# Patient Record
Sex: FEMALE | Race: WHITE | Employment: UNEMPLOYED | URBAN - NONMETROPOLITAN AREA
[De-identification: names, ages, dates, MRNs, and addresses within clinical notes are randomized per-mention and may not be internally consistent; named-entity substitution may affect disease eponyms.]

---

## 2021-06-08 PROBLEM — E66.813 CLASS 3 SEVERE OBESITY DUE TO EXCESS CALORIES WITH SERIOUS COMORBIDITY AND BODY MASS INDEX (BMI) OF 45.0 TO 49.9 IN ADULT: Status: ACTIVE | Noted: 2021-06-08

## 2021-07-27 PROBLEM — E66.813 CLASS 3 SEVERE OBESITY DUE TO EXCESS CALORIES WITH SERIOUS COMORBIDITY AND BODY MASS INDEX (BMI) OF 45.0 TO 49.9 IN ADULT: Status: ACTIVE | Noted: 2021-06-08

## 2024-03-10 SDOH — ECONOMIC STABILITY: FOOD INSECURITY: WITHIN THE PAST 12 MONTHS, YOU WORRIED THAT YOUR FOOD WOULD RUN OUT BEFORE YOU GOT MONEY TO BUY MORE.: PATIENT DECLINED

## 2024-03-10 SDOH — ECONOMIC STABILITY: HOUSING INSECURITY
IN THE LAST 12 MONTHS, WAS THERE A TIME WHEN YOU DID NOT HAVE A STEADY PLACE TO SLEEP OR SLEPT IN A SHELTER (INCLUDING NOW)?: NO

## 2024-03-10 SDOH — ECONOMIC STABILITY: FOOD INSECURITY: WITHIN THE PAST 12 MONTHS, THE FOOD YOU BOUGHT JUST DIDN'T LAST AND YOU DIDN'T HAVE MONEY TO GET MORE.: PATIENT DECLINED

## 2024-03-10 SDOH — ECONOMIC STABILITY: TRANSPORTATION INSECURITY
IN THE PAST 12 MONTHS, HAS LACK OF TRANSPORTATION KEPT YOU FROM MEETINGS, WORK, OR FROM GETTING THINGS NEEDED FOR DAILY LIVING?: NO

## 2024-03-10 SDOH — ECONOMIC STABILITY: INCOME INSECURITY: HOW HARD IS IT FOR YOU TO PAY FOR THE VERY BASICS LIKE FOOD, HOUSING, MEDICAL CARE, AND HEATING?: SOMEWHAT HARD

## 2024-03-13 ENCOUNTER — OFFICE VISIT (OUTPATIENT)
Dept: PRIMARY CARE CLINIC | Age: 54
End: 2024-03-13
Payer: MEDICAID

## 2024-03-13 VITALS
BODY MASS INDEX: 43.99 KG/M2 | DIASTOLIC BLOOD PRESSURE: 80 MMHG | TEMPERATURE: 97.3 F | WEIGHT: 264 LBS | HEART RATE: 77 BPM | SYSTOLIC BLOOD PRESSURE: 130 MMHG | OXYGEN SATURATION: 95 % | HEIGHT: 65 IN

## 2024-03-13 DIAGNOSIS — E03.9 ACQUIRED HYPOTHYROIDISM: ICD-10-CM

## 2024-03-13 DIAGNOSIS — I10 ESSENTIAL HYPERTENSION: Primary | ICD-10-CM

## 2024-03-13 DIAGNOSIS — I10 ESSENTIAL HYPERTENSION: ICD-10-CM

## 2024-03-13 DIAGNOSIS — E78.2 MIXED HYPERLIPIDEMIA: ICD-10-CM

## 2024-03-13 PROCEDURE — 3079F DIAST BP 80-89 MM HG: CPT | Performed by: NURSE PRACTITIONER

## 2024-03-13 PROCEDURE — 3075F SYST BP GE 130 - 139MM HG: CPT | Performed by: NURSE PRACTITIONER

## 2024-03-13 PROCEDURE — 99214 OFFICE O/P EST MOD 30 MIN: CPT | Performed by: NURSE PRACTITIONER

## 2024-03-13 RX ORDER — LISINOPRIL 10 MG/1
10 TABLET ORAL DAILY
Qty: 90 TABLET | Refills: 0 | Status: SHIPPED | OUTPATIENT
Start: 2024-03-13 | End: 2024-03-15 | Stop reason: ALTCHOICE

## 2024-03-13 RX ORDER — LEVOTHYROXINE SODIUM 88 UG/1
88 TABLET ORAL DAILY
Qty: 90 TABLET | Refills: 0 | Status: SHIPPED | OUTPATIENT
Start: 2024-03-13 | End: 2025-03-08

## 2024-03-13 RX ORDER — LISINOPRIL 10 MG/1
10 TABLET ORAL DAILY
Qty: 30 TABLET | Refills: 0 | Status: SHIPPED | OUTPATIENT
Start: 2024-03-13 | End: 2024-03-13

## 2024-03-13 RX ORDER — LEVOTHYROXINE SODIUM 88 UG/1
88 TABLET ORAL DAILY
Qty: 60 TABLET | Refills: 0 | Status: SHIPPED | OUTPATIENT
Start: 2024-03-13 | End: 2024-03-13

## 2024-03-13 ASSESSMENT — PATIENT HEALTH QUESTIONNAIRE - PHQ9
SUM OF ALL RESPONSES TO PHQ QUESTIONS 1-9: 8
10. IF YOU CHECKED OFF ANY PROBLEMS, HOW DIFFICULT HAVE THESE PROBLEMS MADE IT FOR YOU TO DO YOUR WORK, TAKE CARE OF THINGS AT HOME, OR GET ALONG WITH OTHER PEOPLE: SOMEWHAT DIFFICULT
6. FEELING BAD ABOUT YOURSELF - OR THAT YOU ARE A FAILURE OR HAVE LET YOURSELF OR YOUR FAMILY DOWN: NOT AT ALL
SUM OF ALL RESPONSES TO PHQ9 QUESTIONS 1 & 2: 2
1. LITTLE INTEREST OR PLEASURE IN DOING THINGS: 2
1. LITTLE INTEREST OR PLEASURE IN DOING THINGS: MORE THAN HALF THE DAYS
2. FEELING DOWN, DEPRESSED OR HOPELESS: NOT AT ALL
9. THOUGHTS THAT YOU WOULD BE BETTER OFF DEAD, OR OF HURTING YOURSELF: 0
2. FEELING DOWN, DEPRESSED OR HOPELESS: 0
SUM OF ALL RESPONSES TO PHQ QUESTIONS 1-9: 8
7. TROUBLE CONCENTRATING ON THINGS, SUCH AS READING THE NEWSPAPER OR WATCHING TELEVISION: SEVERAL DAYS
8. MOVING OR SPEAKING SO SLOWLY THAT OTHER PEOPLE COULD HAVE NOTICED. OR THE OPPOSITE, BEING SO FIGETY OR RESTLESS THAT YOU HAVE BEEN MOVING AROUND A LOT MORE THAN USUAL: 0
3. TROUBLE FALLING OR STAYING ASLEEP: NOT AT ALL
8. MOVING OR SPEAKING SO SLOWLY THAT OTHER PEOPLE COULD HAVE NOTICED. OR THE OPPOSITE - BEING SO FIDGETY OR RESTLESS THAT YOU HAVE BEEN MOVING AROUND A LOT MORE THAN USUAL: NOT AT ALL
10. IF YOU CHECKED OFF ANY PROBLEMS, HOW DIFFICULT HAVE THESE PROBLEMS MADE IT FOR YOU TO DO YOUR WORK, TAKE CARE OF THINGS AT HOME, OR GET ALONG WITH OTHER PEOPLE: 1
4. FEELING TIRED OR HAVING LITTLE ENERGY: MORE THAN HALF THE DAYS
5. POOR APPETITE OR OVEREATING: NEARLY EVERY DAY
6. FEELING BAD ABOUT YOURSELF - OR THAT YOU ARE A FAILURE OR HAVE LET YOURSELF OR YOUR FAMILY DOWN: 0
SUM OF ALL RESPONSES TO PHQ QUESTIONS 1-9: 8
7. TROUBLE CONCENTRATING ON THINGS, SUCH AS READING THE NEWSPAPER OR WATCHING TELEVISION: 1
4. FEELING TIRED OR HAVING LITTLE ENERGY: 2
3. TROUBLE FALLING OR STAYING ASLEEP: 0
SUM OF ALL RESPONSES TO PHQ QUESTIONS 1-9: 8
SUM OF ALL RESPONSES TO PHQ QUESTIONS 1-9: 8
9. THOUGHTS THAT YOU WOULD BE BETTER OFF DEAD, OR OF HURTING YOURSELF: NOT AT ALL
5. POOR APPETITE OR OVEREATING: 3

## 2024-03-13 ASSESSMENT — ENCOUNTER SYMPTOMS
DIARRHEA: 0
VOMITING: 0
COLOR CHANGE: 0
ABDOMINAL PAIN: 0
SORE THROAT: 0
SHORTNESS OF BREATH: 0
CHEST TIGHTNESS: 0
NAUSEA: 0
COUGH: 0

## 2024-03-13 NOTE — PROGRESS NOTES
[address] [place] [address] Drive   [address] [facility], [address]     Phone:  [phone]  Fax:  [phone]      Isabella Keys is a 53 y.o. female who presents today for her medical conditions/complaints as noted below.  Isabella Keys is c/o of Hypertension      Chief Complaint   Patient presents with    Hypertension       HPI:     HPI     Patient presents for elevated blood pressure. Blood pressure has been as high as 150/100 on Friday and has not been taking any of her medication related to not having insurance. Denies any shortness of breath or chest pain.    No past medical history on file.     Past Surgical History:   Procedure Laterality Date    CHOLECYSTECTOMY      DILATION AND CURETTAGE OF UTERUS      HYSTERECTOMY (CERVIX STATUS UNKNOWN)      PARTIAL HYSTERECTOMY (CERVIX NOT REMOVED)      TONSILLECTOMY AND ADENOIDECTOMY      TUBAL LIGATION         Social History     Tobacco Use    Smoking status: Former     Current packs/day: 0.00     Average packs/day: 1 pack/day for 18.0 years (18.0 ttl pk-yrs)     Types: Cigarettes     Start date:      Quit date:      Years since quittin.2    Smokeless tobacco: Never   Substance Use Topics    Alcohol use: No        Current Outpatient Medications   Medication Sig Dispense Refill    lisinopril (PRINIVIL;ZESTRIL) 10 MG tablet Take 1 tablet by mouth daily 30 tablet 0    levothyroxine (SYNTHROID) 88 MCG tablet Take 1 tablet by mouth daily 60 tablet 0     No current facility-administered medications for this visit.       Allergies   Allergen Reactions    Penicillins     Sulfa Antibiotics        Family History   Problem Relation Age of Onset    Diabetes Paternal Uncle     High Blood Pressure Paternal Uncle     Diabetes Paternal Grandmother     High Blood Pressure Paternal Grandmother     Heart Attack Paternal Grandmother     Heart Disease Paternal Grandmother     Colon Cancer Neg Hx     Colon Polyps Neg Hx                Subjective:      Review of Systems

## 2024-03-15 ENCOUNTER — OFFICE VISIT (OUTPATIENT)
Dept: PRIMARY CARE CLINIC | Age: 54
End: 2024-03-15
Payer: MEDICAID

## 2024-03-15 VITALS
HEART RATE: 81 BPM | BODY MASS INDEX: 43.89 KG/M2 | TEMPERATURE: 97.5 F | OXYGEN SATURATION: 98 % | HEIGHT: 65 IN | SYSTOLIC BLOOD PRESSURE: 122 MMHG | WEIGHT: 263.4 LBS | DIASTOLIC BLOOD PRESSURE: 88 MMHG

## 2024-03-15 DIAGNOSIS — I10 ESSENTIAL HYPERTENSION: Primary | ICD-10-CM

## 2024-03-15 DIAGNOSIS — E03.9 ACQUIRED HYPOTHYROIDISM: ICD-10-CM

## 2024-03-15 PROCEDURE — 3079F DIAST BP 80-89 MM HG: CPT | Performed by: NURSE PRACTITIONER

## 2024-03-15 PROCEDURE — 99214 OFFICE O/P EST MOD 30 MIN: CPT | Performed by: NURSE PRACTITIONER

## 2024-03-15 PROCEDURE — 3074F SYST BP LT 130 MM HG: CPT | Performed by: NURSE PRACTITIONER

## 2024-03-15 NOTE — PROGRESS NOTES
[address] [place] [address] Drive   [address] [facility], [address]     Phone:  [phone]  Fax:  [phone]      Isabella Keys is a 53 y.o. female who presents today for her medical conditions/complaints as noted below.  Isabella Keys is c/o of Medication Problem, Bloated, and Dizziness (Off balance)      Chief Complaint   Patient presents with    Medication Problem    Bloated    Dizziness     Off balance       HPI:     HPI    Patient presents with concerns related to her lisinopril. This was restarted 2 days ago and \"ever since taking it I have felt dizzy, bloated, light headed, and\"just not right.\" Blood pressure at home this morning after taking her medicine was 68/42. She took it a second time and and it was 88/62. 122/88 in office today. Denies any shortness of breath or chest pain. Denies any concerns with her thyroid.     History reviewed. No pertinent past medical history.     Past Surgical History:   Procedure Laterality Date    CHOLECYSTECTOMY      DILATION AND CURETTAGE OF UTERUS      HYSTERECTOMY (CERVIX STATUS UNKNOWN)      PARTIAL HYSTERECTOMY (CERVIX NOT REMOVED)      TONSILLECTOMY AND ADENOIDECTOMY  1986    TUBAL LIGATION         Social History     Tobacco Use    Smoking status: Former     Current packs/day: 0.00     Average packs/day: 1 pack/day for 18.0 years (18.0 ttl pk-yrs)     Types: Cigarettes     Start date:      Quit date:      Years since quittin.2    Smokeless tobacco: Never   Substance Use Topics    Alcohol use: No        Current Outpatient Medications   Medication Sig Dispense Refill    levothyroxine (SYNTHROID) 88 MCG tablet TAKE 1 TABLET BY MOUTH DAILY 90 tablet 0     No current facility-administered medications for this visit.       Allergies   Allergen Reactions    Penicillins     Sulfa Antibiotics        Family History   Problem Relation Age of Onset    Diabetes Paternal Uncle     High Blood Pressure Paternal Uncle     Diabetes Paternal Grandmother     High Blood

## 2024-03-25 ENCOUNTER — OFFICE VISIT (OUTPATIENT)
Dept: PRIMARY CARE CLINIC | Age: 54
End: 2024-03-25
Payer: MEDICAID

## 2024-03-25 VITALS
TEMPERATURE: 97.7 F | BODY MASS INDEX: 43.28 KG/M2 | WEIGHT: 259.8 LBS | SYSTOLIC BLOOD PRESSURE: 110 MMHG | HEART RATE: 64 BPM | DIASTOLIC BLOOD PRESSURE: 78 MMHG | OXYGEN SATURATION: 100 % | HEIGHT: 65 IN

## 2024-03-25 DIAGNOSIS — E03.9 ACQUIRED HYPOTHYROIDISM: ICD-10-CM

## 2024-03-25 DIAGNOSIS — I10 ESSENTIAL HYPERTENSION: ICD-10-CM

## 2024-03-25 DIAGNOSIS — E78.2 MIXED HYPERLIPIDEMIA: ICD-10-CM

## 2024-03-25 DIAGNOSIS — I10 ESSENTIAL HYPERTENSION: Primary | ICD-10-CM

## 2024-03-25 LAB
ALBUMIN SERPL-MCNC: 4.1 G/DL (ref 3.5–5.2)
ALP SERPL-CCNC: 142 U/L (ref 35–104)
ALT SERPL-CCNC: 21 U/L (ref 5–33)
ANION GAP SERPL CALCULATED.3IONS-SCNC: 7 MMOL/L (ref 7–19)
AST SERPL-CCNC: 16 U/L (ref 5–32)
BASOPHILS # BLD: 0 K/UL (ref 0–0.2)
BASOPHILS NFR BLD: 0.4 % (ref 0–1)
BILIRUB SERPL-MCNC: 0.3 MG/DL (ref 0.2–1.2)
BUN SERPL-MCNC: 19 MG/DL (ref 6–20)
CALCIUM SERPL-MCNC: 9.2 MG/DL (ref 8.6–10)
CHLORIDE SERPL-SCNC: 105 MMOL/L (ref 98–111)
CHOLEST SERPL-MCNC: 195 MG/DL (ref 160–199)
CO2 SERPL-SCNC: 29 MMOL/L (ref 22–29)
CREAT SERPL-MCNC: 0.9 MG/DL (ref 0.5–0.9)
CREAT UR-MCNC: 216.3 MG/DL (ref 28–217)
EOSINOPHIL # BLD: 0.1 K/UL (ref 0–0.6)
EOSINOPHIL NFR BLD: 0.8 % (ref 0–5)
ERYTHROCYTE [DISTWIDTH] IN BLOOD BY AUTOMATED COUNT: 13.2 % (ref 11.5–14.5)
GLUCOSE SERPL-MCNC: 111 MG/DL (ref 74–109)
HCT VFR BLD AUTO: 45.1 % (ref 37–47)
HDLC SERPL-MCNC: 41 MG/DL (ref 65–121)
HGB BLD-MCNC: 14.2 G/DL (ref 12–16)
IMM GRANULOCYTES # BLD: 0 K/UL
LDLC SERPL CALC-MCNC: 127 MG/DL
LYMPHOCYTES # BLD: 2.8 K/UL (ref 1.1–4.5)
LYMPHOCYTES NFR BLD: 30.4 % (ref 20–40)
MCH RBC QN AUTO: 29 PG (ref 27–31)
MCHC RBC AUTO-ENTMCNC: 31.5 G/DL (ref 33–37)
MCV RBC AUTO: 92 FL (ref 81–99)
MICROALBUMIN UR-MCNC: <1.2 MG/DL (ref 0–19)
MICROALBUMIN/CREAT UR-RTO: NORMAL MG/G
MONOCYTES # BLD: 0.5 K/UL (ref 0–0.9)
MONOCYTES NFR BLD: 5.7 % (ref 0–10)
NEUTROPHILS # BLD: 5.7 K/UL (ref 1.5–7.5)
NEUTS SEG NFR BLD: 62.5 % (ref 50–65)
PLATELET # BLD AUTO: 251 K/UL (ref 130–400)
PMV BLD AUTO: 11.4 FL (ref 9.4–12.3)
POTASSIUM SERPL-SCNC: 4.2 MMOL/L (ref 3.5–5)
PROT SERPL-MCNC: 7.8 G/DL (ref 6.6–8.7)
RBC # BLD AUTO: 4.9 M/UL (ref 4.2–5.4)
SODIUM SERPL-SCNC: 141 MMOL/L (ref 136–145)
T4 FREE SERPL-MCNC: 1.12 NG/DL (ref 0.93–1.7)
TRIGL SERPL-MCNC: 135 MG/DL (ref 0–149)
TSH SERPL DL<=0.005 MIU/L-ACNC: 1.39 UIU/ML (ref 0.27–4.2)
WBC # BLD AUTO: 9.2 K/UL (ref 4.8–10.8)

## 2024-03-25 PROCEDURE — 3078F DIAST BP <80 MM HG: CPT | Performed by: NURSE PRACTITIONER

## 2024-03-25 PROCEDURE — 99214 OFFICE O/P EST MOD 30 MIN: CPT | Performed by: NURSE PRACTITIONER

## 2024-03-25 PROCEDURE — 3074F SYST BP LT 130 MM HG: CPT | Performed by: NURSE PRACTITIONER

## 2024-03-25 RX ORDER — LEVOTHYROXINE SODIUM 88 UG/1
88 TABLET ORAL DAILY
Qty: 90 TABLET | Refills: 3 | Status: SHIPPED | OUTPATIENT
Start: 2024-03-25 | End: 2025-03-20

## 2024-03-25 ASSESSMENT — ENCOUNTER SYMPTOMS
COUGH: 0
NAUSEA: 0
SORE THROAT: 0
VOMITING: 0
CHEST TIGHTNESS: 0
ABDOMINAL PAIN: 0
COLOR CHANGE: 0
SHORTNESS OF BREATH: 0
DIARRHEA: 0

## 2024-03-25 NOTE — PROGRESS NOTES
[address] [place] [address] Drive   [address] [facility], [address]     Phone:  [phone]  Fax:  [phone]      Isabella Keys is a 53 y.o. female who presents today for her medical conditions/complaints as noted below.  Isabella Keys is c/o of 2 Week Follow-Up      Chief Complaint   Patient presents with    2 Week Follow-Up       HPI:     HPI    Patient presents for a 2 week follow up for hypertension. Denies any dizziness, headaches, shortness of breath, or chest pain. Feels fatigued. Has not been taking lisinopril. Blood pressure has been as high as 150/90 at home, and has also been lower.  Blood pressure is 110/78 in office today    History reviewed. No pertinent past medical history.     Past Surgical History:   Procedure Laterality Date    CHOLECYSTECTOMY      DILATION AND CURETTAGE OF UTERUS      HYSTERECTOMY (CERVIX STATUS UNKNOWN)      PARTIAL HYSTERECTOMY (CERVIX NOT REMOVED)      TONSILLECTOMY AND ADENOIDECTOMY      TUBAL LIGATION         Social History     Tobacco Use    Smoking status: Former     Current packs/day: 0.00     Average packs/day: 1 pack/day for 18.0 years (18.0 ttl pk-yrs)     Types: Cigarettes     Start date:      Quit date:      Years since quittin.2    Smokeless tobacco: Never   Substance Use Topics    Alcohol use: No        Current Outpatient Medications   Medication Sig Dispense Refill    levothyroxine (SYNTHROID) 88 MCG tablet Take 1 tablet by mouth daily 90 tablet 3     No current facility-administered medications for this visit.       Allergies   Allergen Reactions    Penicillins     Sulfa Antibiotics        Family History   Problem Relation Age of Onset    Diabetes Paternal Uncle     High Blood Pressure Paternal Uncle     Diabetes Paternal Grandmother     High Blood Pressure Paternal Grandmother     Heart Attack Paternal Grandmother     Heart Disease Paternal Grandmother     Colon Cancer Neg Hx     Colon Polyps Neg Hx                Subjective:      Review of

## 2024-04-10 ENCOUNTER — OFFICE VISIT (OUTPATIENT)
Dept: PRIMARY CARE CLINIC | Age: 54
End: 2024-04-10
Payer: MEDICAID

## 2024-04-10 VITALS
DIASTOLIC BLOOD PRESSURE: 86 MMHG | TEMPERATURE: 97.1 F | BODY MASS INDEX: 42.99 KG/M2 | HEIGHT: 65 IN | HEART RATE: 67 BPM | OXYGEN SATURATION: 99 % | RESPIRATION RATE: 18 BRPM | WEIGHT: 258 LBS | SYSTOLIC BLOOD PRESSURE: 118 MMHG

## 2024-04-10 DIAGNOSIS — L02.422 FURUNCLE OF LEFT AXILLA: Primary | ICD-10-CM

## 2024-04-10 PROCEDURE — 3074F SYST BP LT 130 MM HG: CPT | Performed by: NURSE PRACTITIONER

## 2024-04-10 PROCEDURE — 99214 OFFICE O/P EST MOD 30 MIN: CPT | Performed by: NURSE PRACTITIONER

## 2024-04-10 PROCEDURE — 3079F DIAST BP 80-89 MM HG: CPT | Performed by: NURSE PRACTITIONER

## 2024-04-10 RX ORDER — CLINDAMYCIN HYDROCHLORIDE 300 MG/1
300 CAPSULE ORAL 3 TIMES DAILY
Qty: 30 CAPSULE | Refills: 0 | Status: SHIPPED | OUTPATIENT
Start: 2024-04-10 | End: 2024-04-20

## 2024-04-10 NOTE — PROGRESS NOTES
[address] [place] [address] Drive   [address] [facility], [address]     Phone:  [phone]  Fax:  [phone]      Isabella Keys is a 53 y.o. female who presents today for her medical conditions/complaints as noted below.  Isabella Keys is c/o of Cyst (Left axilla; painful, red, swollen)      Chief Complaint   Patient presents with    Cyst     Left axilla; painful, red, swollen       HPI:     HPI    Patient presents for left axilla swelling that has been painful and red for the last 3 days. Denies any drainage, fever or chills.     No past medical history on file.     Past Surgical History:   Procedure Laterality Date    CHOLECYSTECTOMY      DILATION AND CURETTAGE OF UTERUS      HYSTERECTOMY (CERVIX STATUS UNKNOWN)      PARTIAL HYSTERECTOMY (CERVIX NOT REMOVED)      TONSILLECTOMY AND ADENOIDECTOMY      TUBAL LIGATION         Social History     Tobacco Use    Smoking status: Former     Current packs/day: 0.00     Average packs/day: 1 pack/day for 18.0 years (18.0 ttl pk-yrs)     Types: Cigarettes     Start date:      Quit date:      Years since quittin.3    Smokeless tobacco: Never   Substance Use Topics    Alcohol use: No        Current Outpatient Medications   Medication Sig Dispense Refill    clindamycin (CLEOCIN) 300 MG capsule Take 1 capsule by mouth 3 times daily for 10 days 30 capsule 0    levothyroxine (SYNTHROID) 88 MCG tablet Take 1 tablet by mouth daily 90 tablet 3     No current facility-administered medications for this visit.       Allergies   Allergen Reactions    Penicillins     Sulfa Antibiotics        Family History   Problem Relation Age of Onset    Diabetes Paternal Uncle     High Blood Pressure Paternal Uncle     Diabetes Paternal Grandmother     High Blood Pressure Paternal Grandmother     Heart Attack Paternal Grandmother     Heart Disease Paternal Grandmother     Colon Cancer Neg Hx     Colon Polyps Neg Hx                Subjective:      Review of Systems   Constitutional:

## 2024-04-16 ASSESSMENT — ENCOUNTER SYMPTOMS
CHEST TIGHTNESS: 0
VOMITING: 0
COUGH: 0
NAUSEA: 0
ABDOMINAL PAIN: 0
SORE THROAT: 0
SHORTNESS OF BREATH: 0
COLOR CHANGE: 0
DIARRHEA: 0

## 2024-04-29 ENCOUNTER — OFFICE VISIT (OUTPATIENT)
Dept: PRIMARY CARE CLINIC | Age: 54
End: 2024-04-29
Payer: MEDICAID

## 2024-04-29 VITALS
TEMPERATURE: 97 F | SYSTOLIC BLOOD PRESSURE: 110 MMHG | HEIGHT: 65 IN | DIASTOLIC BLOOD PRESSURE: 74 MMHG | BODY MASS INDEX: 44.68 KG/M2 | HEART RATE: 64 BPM | OXYGEN SATURATION: 97 % | WEIGHT: 268.2 LBS

## 2024-04-29 DIAGNOSIS — E66.01 OBESITY, CLASS III, BMI 40-49.9 (MORBID OBESITY) (HCC): ICD-10-CM

## 2024-04-29 DIAGNOSIS — Z12.12 SCREENING FOR COLORECTAL CANCER: ICD-10-CM

## 2024-04-29 DIAGNOSIS — E66.01 OBESITY, CLASS III, BMI 40-49.9 (MORBID OBESITY) (HCC): Primary | ICD-10-CM

## 2024-04-29 DIAGNOSIS — Z12.11 SCREENING FOR COLORECTAL CANCER: ICD-10-CM

## 2024-04-29 PROCEDURE — 99214 OFFICE O/P EST MOD 30 MIN: CPT | Performed by: NURSE PRACTITIONER

## 2024-04-29 PROCEDURE — 3078F DIAST BP <80 MM HG: CPT | Performed by: NURSE PRACTITIONER

## 2024-04-29 PROCEDURE — 3074F SYST BP LT 130 MM HG: CPT | Performed by: NURSE PRACTITIONER

## 2024-04-29 ASSESSMENT — ENCOUNTER SYMPTOMS
ABDOMINAL PAIN: 0
CHEST TIGHTNESS: 0
SHORTNESS OF BREATH: 0
SORE THROAT: 0
NAUSEA: 0
VOMITING: 0
COLOR CHANGE: 0
COUGH: 0
DIARRHEA: 0

## 2024-04-29 NOTE — PROGRESS NOTES
[address] [place] [address] Drive   [address] [facility], [address]     Phone:  [phone]  Fax:  [phone]      Isabella Keys is a 53 y.o. female who presents today for her medical conditions/complaints as noted below.  Isabella Keys is c/o of 1 Month Follow-Up, Hypertension, and Cyst (Furnucle)      Chief Complaint   Patient presents with    1 Month Follow-Up    Hypertension    Cyst     Furnucle       HPI:     HPI     Patient presents for 2 week follow up for furnucle left axilla. Patient reports area is much better. No redness, or drainage present. Does feel some tenderness at times. Would like to lose weight and has tried metformin the past with success.  Is agreeable to do a cologuard for colorectal cancer screening. Denies any family history of colorectal cancer.     No past medical history on file.     Past Surgical History:   Procedure Laterality Date    CHOLECYSTECTOMY      DILATION AND CURETTAGE OF UTERUS      HYSTERECTOMY (CERVIX STATUS UNKNOWN)      PARTIAL HYSTERECTOMY (CERVIX NOT REMOVED)      TONSILLECTOMY AND ADENOIDECTOMY      TUBAL LIGATION         Social History     Tobacco Use    Smoking status: Former     Current packs/day: 0.00     Average packs/day: 1 pack/day for 18.0 years (18.0 ttl pk-yrs)     Types: Cigarettes     Start date:      Quit date:      Years since quittin.3    Smokeless tobacco: Never   Substance Use Topics    Alcohol use: No        Current Outpatient Medications   Medication Sig Dispense Refill    metFORMIN (GLUCOPHAGE) 500 MG tablet Take 1 tablet by mouth 2 times daily (with meals) 60 tablet 0    levothyroxine (SYNTHROID) 88 MCG tablet Take 1 tablet by mouth daily 90 tablet 3     No current facility-administered medications for this visit.       Allergies   Allergen Reactions    Penicillins     Sulfa Antibiotics        Family History   Problem Relation Age of Onset    Diabetes Paternal Uncle     High Blood Pressure Paternal Uncle     Diabetes Paternal

## 2024-05-14 LAB — NONINV COLON CA DNA+OCC BLD SCRN STL QL: NEGATIVE

## 2024-05-30 ENCOUNTER — OFFICE VISIT (OUTPATIENT)
Dept: FAMILY MEDICINE CLINIC | Facility: CLINIC | Age: 54
End: 2024-05-30
Payer: MEDICAID

## 2024-05-30 VITALS
OXYGEN SATURATION: 98 % | HEIGHT: 65 IN | WEIGHT: 266 LBS | HEART RATE: 97 BPM | SYSTOLIC BLOOD PRESSURE: 112 MMHG | BODY MASS INDEX: 44.32 KG/M2 | TEMPERATURE: 98.1 F | DIASTOLIC BLOOD PRESSURE: 70 MMHG | RESPIRATION RATE: 18 BRPM

## 2024-05-30 DIAGNOSIS — I10 PRIMARY HYPERTENSION: ICD-10-CM

## 2024-05-30 DIAGNOSIS — E03.9 HYPOTHYROIDISM, UNSPECIFIED TYPE: ICD-10-CM

## 2024-05-30 DIAGNOSIS — L98.9 SKIN LESION: ICD-10-CM

## 2024-05-30 DIAGNOSIS — Z13.9 SCREENING DUE: ICD-10-CM

## 2024-05-30 DIAGNOSIS — E66.01 CLASS 3 SEVERE OBESITY DUE TO EXCESS CALORIES WITH SERIOUS COMORBIDITY AND BODY MASS INDEX (BMI) OF 40.0 TO 44.9 IN ADULT: ICD-10-CM

## 2024-05-30 DIAGNOSIS — Z76.89 ENCOUNTER TO ESTABLISH CARE: Primary | ICD-10-CM

## 2024-05-30 LAB
EXPIRATION DATE: ABNORMAL
HBA1C MFR BLD: 5.8 % (ref 4.5–5.7)
Lab: ABNORMAL

## 2024-05-30 PROCEDURE — 3078F DIAST BP <80 MM HG: CPT | Performed by: NURSE PRACTITIONER

## 2024-05-30 PROCEDURE — 99204 OFFICE O/P NEW MOD 45 MIN: CPT | Performed by: NURSE PRACTITIONER

## 2024-05-30 PROCEDURE — 83036 HEMOGLOBIN GLYCOSYLATED A1C: CPT | Performed by: NURSE PRACTITIONER

## 2024-05-30 PROCEDURE — 3074F SYST BP LT 130 MM HG: CPT | Performed by: NURSE PRACTITIONER

## 2024-05-30 PROCEDURE — 3044F HG A1C LEVEL LT 7.0%: CPT | Performed by: NURSE PRACTITIONER

## 2024-05-30 RX ORDER — HYDROCHLOROTHIAZIDE 12.5 MG/1
12.5 TABLET ORAL DAILY
Qty: 30 TABLET | Refills: 1 | Status: SHIPPED | OUTPATIENT
Start: 2024-05-30

## 2024-05-30 RX ORDER — LEVOTHYROXINE SODIUM 88 UG/1
88 TABLET ORAL DAILY
Qty: 90 TABLET | Refills: 1 | Status: SHIPPED | OUTPATIENT
Start: 2024-05-30

## 2024-05-30 NOTE — PROGRESS NOTES
"DANA Castle  [facility]   Family Medicine  [address]  [place] [address]  Phone: [phone]  Fax: [phone]         Chief Complaint:  Chief Complaint   Patient presents with    Establish Care    Hypertension        History:  Libia Trevino is a 53 y.o. female that is an established patient. She  is here for evaluation of the above complaint and management of chronic conditions.    HPI     She is here to establish care. She has known PMH of hypertension, hyperlipidemia, hypothyroidism, anxiety, arthritis. Labs 3/2024 show lipid panel wnl, thyroid functions wnl, A1c is 5.8% today. Mammogram due 9/2024. She is s/p benign hysterectomy 2008. Cologuard negative 5/2024.    She is taking Metformin for weight loss, but A1c shows prediabetes today. She has  askin tag on the upper right extremity that has gotten larger over the last 10 years and is irritating when rubbing on clothes and in hot weather.     Mood is stable, not taking Abilify, Bupropion now. She is a former smoker, quit in 2012.         ROS:  Review of Systems   Constitutional: Negative.    HENT: Negative.     Respiratory: Negative.     Cardiovascular: Negative.    Gastrointestinal: Negative.    Skin:         Skin lesion   Psychiatric/Behavioral: Negative.           reports that she quit smoking about 12 years ago. Her smoking use included cigarettes. She has never used smokeless tobacco. She reports that she does not currently use alcohol. She reports that she does not use drugs.    Current Outpatient Medications   Medication Instructions    hydroCHLOROthiazide 12.5 mg, Oral, Daily    levothyroxine (SYNTHROID, LEVOTHROID) 88 mcg, Oral, Daily    metFORMIN (GLUCOPHAGE) 1,000 mg, Oral, 2 Times Daily With Meals, Weight loss    Vitamin B 12 500 mcg, Oral, Daily       OBJECTIVE:  /70   Pulse 97   Temp 98.1 °F (36.7 °C) (Temporal)   Resp 18   Ht 165.1 cm (65\")   Wt 121 kg (266 lb)   SpO2 98%   BMI 44.26 kg/m²  "   Physical Exam  Vitals and nursing note reviewed.   Constitutional:       Appearance: Normal appearance.   HENT:      Head: Normocephalic and atraumatic.      Right Ear: Tympanic membrane, ear canal and external ear normal.      Left Ear: Hearing, ear canal and external ear normal. There is impacted cerumen.      Nose: Nose normal.      Mouth/Throat:      Pharynx: Oropharynx is clear. No oropharyngeal exudate or posterior oropharyngeal erythema.   Eyes:      Conjunctiva/sclera: Conjunctivae normal.   Cardiovascular:      Rate and Rhythm: Normal rate and regular rhythm.   Pulmonary:      Effort: Pulmonary effort is normal. No respiratory distress.      Breath sounds: Normal breath sounds. No wheezing.   Skin:            Comments: 2 cm round skin tag   Neurological:      General: No focal deficit present.      Mental Status: She is alert and oriented to person, place, and time.   Psychiatric:         Mood and Affect: Mood normal.         Behavior: Behavior normal.         Procedures    Assessment/Plan:     Diagnoses and all orders for this visit:    1. Encounter to establish care (Primary)    2. Screening due  -     POC Glycosylated Hemoglobin (Hb A1C)    3. Skin lesion  -     Ambulatory Referral to Dermatology    4. Primary hypertension  -     hydroCHLOROthiazide 12.5 MG tablet; Take 1 tablet by mouth Daily.  Dispense: 30 tablet; Refill: 1    5. Hypothyroidism, unspecified type  -     levothyroxine (SYNTHROID, LEVOTHROID) 88 MCG tablet; Take 1 tablet by mouth Daily.  Dispense: 90 tablet; Refill: 1    6. Class 3 severe obesity due to excess calories with serious comorbidity and body mass index (BMI) of 40.0 to 44.9 in adult  -     metFORMIN (GLUCOPHAGE) 500 MG tablet; Take 2 tablets by mouth 2 (Two) Times a Day With Meals. Weight loss  Dispense: 180 tablet; Refill: 1      Class 3 Severe Obesity (BMI >=40). Obesity-related health conditions include the following: hypertension. Obesity is newly identified. BMI is is  above average; BMI management plan is completed. We discussed portion control and increasing exercise.       An After Visit Summary was printed and given to the patient at discharge.  Return in about 3 months (around 8/30/2024).       Patient Instructions   HCTZ 12.5 mg, 1 by mouth daily  Continue Levothyroxine as you are  Metformin 1000 mg twice a day      Discussion:     Return 3 months for f/u hypertension, annual exam    I spent 32 minutes caring for Libia on this date of service. This time includes time spent by me in the following activities: preparing for the visit, reviewing tests, obtaining and/or reviewing a separately obtained history, performing a medically appropriate examination and/or evaluation, counseling and educating the patient/family/caregiver, ordering medications, tests, or procedures, referring and communicating with other health care professionals, documenting information in the medical record, and independently interpreting results and communicating that information with the patient/family/caregiver     Malena CORTEZ 5/30/2024   Electronically signed.

## 2024-05-31 DIAGNOSIS — E78.5 HYPERLIPIDEMIA, UNSPECIFIED HYPERLIPIDEMIA TYPE: Primary | ICD-10-CM

## 2024-06-06 ENCOUNTER — TELEPHONE (OUTPATIENT)
Dept: FAMILY MEDICINE CLINIC | Facility: CLINIC | Age: 54
End: 2024-06-06
Payer: MEDICAID

## 2024-06-06 NOTE — TELEPHONE ENCOUNTER
Patient called and stated dermatologist is too far away and can't get her in until July. She would like to see if we can refer her somewhere in [place] to get her in sooner or to a general surgeon.

## 2024-06-21 ENCOUNTER — OFFICE VISIT (OUTPATIENT)
Dept: FAMILY MEDICINE CLINIC | Facility: CLINIC | Age: 54
End: 2024-06-21
Payer: MEDICAID

## 2024-06-21 VITALS
TEMPERATURE: 97.8 F | HEIGHT: 65 IN | BODY MASS INDEX: 44.32 KG/M2 | WEIGHT: 266 LBS | DIASTOLIC BLOOD PRESSURE: 72 MMHG | SYSTOLIC BLOOD PRESSURE: 114 MMHG | OXYGEN SATURATION: 96 % | HEART RATE: 73 BPM | RESPIRATION RATE: 18 BRPM

## 2024-06-21 DIAGNOSIS — L91.8 SKIN TAG: Primary | ICD-10-CM

## 2024-06-21 PROCEDURE — 99213 OFFICE O/P EST LOW 20 MIN: CPT | Performed by: NURSE PRACTITIONER

## 2024-06-21 PROCEDURE — 11200 RMVL SKIN TAGS UP TO&INC 15: CPT | Performed by: NURSE PRACTITIONER

## 2024-06-21 PROCEDURE — 88304 TISSUE EXAM BY PATHOLOGIST: CPT | Performed by: NURSE PRACTITIONER

## 2024-06-21 PROCEDURE — 3074F SYST BP LT 130 MM HG: CPT | Performed by: NURSE PRACTITIONER

## 2024-06-21 PROCEDURE — 3078F DIAST BP <80 MM HG: CPT | Performed by: NURSE PRACTITIONER

## 2024-06-21 NOTE — PROGRESS NOTES
" DANA Castle  [facility]   Family Medicine  [address]  [place] [address]  Phone: [phone]  Fax: [phone]         Chief Complaint:  Chief Complaint   Patient presents with    spot on leg        History:  Libia Trevino is a 53 y.o. female that is an established patient. She  is here for evaluation of the above complaint.    HPI     Pt has 1 cm round skin tag proximal right lower extremity which is causing discomfort when rubbing on clothes. She would like it removed. Discussed r/b/a including bleeding, infection, she elects to continue. Consent signed.           ROS:  Review of Systems   Constitutional: Negative.    HENT: Negative.     Respiratory: Negative.     Cardiovascular: Negative.    Skin:         Skin tag         reports that she quit smoking about 12 years ago. Her smoking use included cigarettes. She has never used smokeless tobacco. She reports that she does not currently use alcohol. She reports that she does not use drugs.    Current Outpatient Medications   Medication Instructions    hydroCHLOROthiazide 12.5 mg, Oral, Daily    levothyroxine (SYNTHROID, LEVOTHROID) 88 mcg, Oral, Daily    metFORMIN (GLUCOPHAGE) 1,000 mg, Oral, 2 Times Daily With Meals, Weight loss    Vitamin B 12 500 mcg, Oral, Daily       OBJECTIVE:  /72   Pulse 73   Temp 97.8 °F (36.6 °C) (Temporal)   Resp 18   Ht 165.1 cm (65\")   Wt 121 kg (266 lb)   SpO2 96%   BMI 44.26 kg/m²    Physical Exam  Vitals and nursing note reviewed.   Constitutional:       Appearance: Normal appearance.   HENT:      Head: Normocephalic and atraumatic.      Nose: Nose normal.   Eyes:      Conjunctiva/sclera: Conjunctivae normal.   Pulmonary:      Effort: Pulmonary effort is normal.   Skin:            Comments: 1 cm round skin tag; no erythema, discharge   Neurological:      General: No focal deficit present.      Mental Status: She is alert and oriented to person, place, and time.   Psychiatric:       "   Mood and Affect: Mood normal.         Behavior: Behavior normal.         Skin Tag Removal    Date/Time: 6/21/2024 2:40 PM    Performed by: Malena Wang APRN  Authorized by: Malena Wang APRN  Preparation: Patient was prepped and draped in the usual sterile fashion.  Local anesthesia used: yes  Anesthesia: local infiltration    Anesthesia:  Local anesthesia used: yes  Local Anesthetic: lidocaine 1% without epinephrine  Anesthetic total: 1.5 mL    Sedation:  Patient sedated: no    Patient tolerance: patient tolerated the procedure well with no immediate complications  Comments: Area prepped and cleaned in the usual fashion, iodine solution. Time out, confirmed pt, procedure, allergies. Skin tag removed, after local anesthesia administered, with sterile scissors, hemostasis achieved with silver nitrate. Blood loss minimal. Bandaged with telfa dressing, after care instructions given.          Assessment/Plan:     Diagnoses and all orders for this visit:    1. Skin tag (Primary)  -     Tissue Pathology Exam; Future    Other orders  -     Skin Tag Removal             An After Visit Summary was printed and given to the patient at discharge.  Return for Next scheduled follow up.       Patient Instructions   Keep area clean and dry  Triple antibiotic ointment twice a day      Discussion:     I spent 25 minutes caring for Libia on this date of service. This time includes time spent by me in the following activities: preparing for the visit, reviewing tests, performing a medically appropriate examination and/or evaluation, counseling and educating the patient/family/caregiver, documenting information in the medical record, care coordination, and procedure: skin tag deven      Malena CORTEZ 6/21/2024   Electronically signed.  Answers submitted by the patient for this visit:  Other (Submitted on 6/20/2024)  Please describe your symptoms.: Mole on leg  Have you had these symptoms before?: No  How long have you  been having these symptoms?: Greater than 2 weeks  Primary Reason for Visit (Submitted on 6/20/2024)  What is the primary reason for your visit?: Other

## 2024-06-24 ENCOUNTER — LAB REQUISITION (OUTPATIENT)
Dept: LAB | Facility: HOSPITAL | Age: 54
End: 2024-06-24
Payer: MEDICAID

## 2024-06-24 DIAGNOSIS — L91.8 OTHER HYPERTROPHIC DISORDERS OF THE SKIN: ICD-10-CM

## 2024-06-25 LAB
CYTO UR: NORMAL
LAB AP CASE REPORT: NORMAL
LAB AP CLINICAL INFORMATION: NORMAL
Lab: NORMAL
PATH REPORT.FINAL DX SPEC: NORMAL
PATH REPORT.GROSS SPEC: NORMAL

## 2024-07-18 DIAGNOSIS — I10 HYPERTENSION, ESSENTIAL, BENIGN: ICD-10-CM

## 2024-07-19 RX ORDER — HYDROCHLOROTHIAZIDE 25 MG/1
TABLET ORAL
Qty: 15 TABLET | Refills: 0 | OUTPATIENT
Start: 2024-07-19

## 2024-08-12 DIAGNOSIS — I10 PRIMARY HYPERTENSION: ICD-10-CM

## 2024-08-12 DIAGNOSIS — E66.01 CLASS 3 SEVERE OBESITY DUE TO EXCESS CALORIES WITH SERIOUS COMORBIDITY AND BODY MASS INDEX (BMI) OF 40.0 TO 44.9 IN ADULT: ICD-10-CM

## 2024-08-12 RX ORDER — HYDROCHLOROTHIAZIDE 12.5 MG/1
12.5 TABLET ORAL DAILY
Qty: 90 TABLET | Refills: 2 | Status: SHIPPED | OUTPATIENT
Start: 2024-08-12

## 2024-08-15 ENCOUNTER — OFFICE VISIT (OUTPATIENT)
Dept: FAMILY MEDICINE CLINIC | Facility: CLINIC | Age: 54
End: 2024-08-15
Payer: MEDICAID

## 2024-08-15 VITALS
RESPIRATION RATE: 19 BRPM | DIASTOLIC BLOOD PRESSURE: 82 MMHG | HEIGHT: 65 IN | SYSTOLIC BLOOD PRESSURE: 132 MMHG | TEMPERATURE: 98.1 F | WEIGHT: 264.2 LBS | BODY MASS INDEX: 44.02 KG/M2 | OXYGEN SATURATION: 98 % | HEART RATE: 64 BPM

## 2024-08-15 DIAGNOSIS — B36.9 FUNGAL SKIN INFECTION: ICD-10-CM

## 2024-08-15 DIAGNOSIS — F41.9 ANXIETY: ICD-10-CM

## 2024-08-15 DIAGNOSIS — E66.01 CLASS 3 SEVERE OBESITY DUE TO EXCESS CALORIES WITH SERIOUS COMORBIDITY AND BODY MASS INDEX (BMI) OF 40.0 TO 44.9 IN ADULT: Primary | ICD-10-CM

## 2024-08-15 DIAGNOSIS — L30.8 OTHER ECZEMA: ICD-10-CM

## 2024-08-15 PROCEDURE — 99214 OFFICE O/P EST MOD 30 MIN: CPT | Performed by: NURSE PRACTITIONER

## 2024-08-15 PROCEDURE — 3079F DIAST BP 80-89 MM HG: CPT | Performed by: NURSE PRACTITIONER

## 2024-08-15 PROCEDURE — 3075F SYST BP GE 130 - 139MM HG: CPT | Performed by: NURSE PRACTITIONER

## 2024-08-15 RX ORDER — PHENTERMINE HYDROCHLORIDE 37.5 MG/1
37.5 TABLET ORAL
Qty: 30 TABLET | Refills: 0 | Status: SHIPPED | OUTPATIENT
Start: 2024-08-15 | End: 2024-09-14

## 2024-08-15 RX ORDER — TRIAMCINOLONE ACETONIDE 1 MG/G
1 CREAM TOPICAL 2 TIMES DAILY
Qty: 45 G | Refills: 2 | Status: SHIPPED | OUTPATIENT
Start: 2024-08-15

## 2024-08-15 RX ORDER — CLOTRIMAZOLE AND BETAMETHASONE DIPROPIONATE 10; .64 MG/G; MG/G
1 CREAM TOPICAL 2 TIMES DAILY
Qty: 45 G | Refills: 2 | Status: SHIPPED | OUTPATIENT
Start: 2024-08-15

## 2024-08-15 NOTE — PROGRESS NOTES
DANA Castle  [facility]   Family Medicine  [address]  [place] [address]  Phone: [phone]  Fax: [phone]         Chief Complaint:  Chief Complaint   Patient presents with    Rash     Pt had a mole removed a few months back and the area has a rash around it.   Pt also has a rash on the back on right ankle, it is itching. Has not changed detergent, no contact with poison ivy or poison oak. No fever.        History:  Libia Trevino is a 54 y.o. female that is an established patient. She  is here for evaluation of the above complaint.    HPI   History of Present Illness  The patient presents for evaluation of multiple medical concerns.    She reports that her current dose of metformin, 1000 mg twice daily, does not seem to be effective as she continues to experience increased appetite. She recalls that when she was previously on metformin, she was able to lose weight without difficulty. Her current weight is 269 pounds, a significant increase from her weight of 195 pounds during her daughter's birth. She also mentions that she did not gain weight during her pregnancies with her sons, but rather lost weight. She has noticed shortness of breath and is not considering surgery at this time. She has not tried any other weight loss methods. She reports no history of cardiovascular issues.    She has been experiencing a rash that resembles hives, which worsens when her tennis shoes rub against it. The rash has been present for some time and has recently become more severe. It is particularly itchy at night, often waking her up due to the need to scratch. The rash is also present in her groin area and appears to be spreading. The intensity of the redness in the groin area seems to be influenced by factors such as sweating, sitting, and clothing irritation. Nighttime showers provide some relief. She has found that applying Vaseline to the back of her heels can help, and when the rash  "is not itchy or bothersome, it tends to dry out and peel. She took hydroxyzine about a week ago, which resulted in grogginess.    She experienced an unusual sensation while driving today, which she had not felt before. She reports no recent anxiety issues but admits to feeling somewhat stressed. She has a medication for anxiety at home, which she has taken in the past, but she cannot recall its name. She took it three times a day and still has a supply left. She is unsure if it is hydroxyzine.      Results         ROS:  Review of Systems   Constitutional:  Positive for unexpected weight change.   HENT: Negative.     Respiratory:  Positive for shortness of breath.    Cardiovascular: Negative.    Skin:  Positive for rash.   Psychiatric/Behavioral:  The patient is nervous/anxious.          reports that she has never smoked. She has never used smokeless tobacco. She reports that she does not currently use alcohol. She reports that she does not use drugs.    Current Outpatient Medications   Medication Instructions    clotrimazole-betamethasone (LOTRISONE) 1-0.05 % cream 1 Application, Topical, 2 Times Daily, Apply to rash in groin as needed    hydroCHLOROthiazide 12.5 mg, Oral, Daily    levothyroxine (SYNTHROID, LEVOTHROID) 88 mcg, Oral, Daily    metFORMIN (GLUCOPHAGE) 500 MG tablet TAKE 2 TABLETS BY MOUTH TWICE DAILY WITH MEALS FOR WEIGHT LOSS    phentermine (ADIPEX-P) 37.5 mg, Oral, Every Morning Before Breakfast    triamcinolone (KENALOG) 0.1 % cream 1 Application, Topical, 2 Times Daily, Apply to right heel as needed, twice a day    Vitamin B 12 500 mcg, Oral, Daily       OBJECTIVE:  /82 (BP Location: Right arm, Patient Position: Sitting, Cuff Size: Large Adult)   Pulse 64   Temp 98.1 °F (36.7 °C) (Oral)   Resp 19   Ht 165.1 cm (65\")   Wt 120 kg (264 lb 3.2 oz)   SpO2 98%   BMI 43.97 kg/m²    Physical Exam  Vitals and nursing note reviewed.   Constitutional:       Appearance: Normal appearance.   HENT: "      Head: Normocephalic and atraumatic.      Nose: Nose normal.   Eyes:      Conjunctiva/sclera: Conjunctivae normal.   Cardiovascular:      Rate and Rhythm: Normal rate and regular rhythm.   Pulmonary:      Effort: Pulmonary effort is normal. No respiratory distress.      Breath sounds: Normal breath sounds. No wheezing or rales.   Skin:     Capillary Refill: Capillary refill takes less than 2 seconds.             Comments: Red-eczema  Purple-fungal skin infection groin   Neurological:      General: No focal deficit present.      Mental Status: She is alert and oriented to person, place, and time.   Psychiatric:         Mood and Affect: Mood normal.         Behavior: Behavior normal.       Physical Exam  Vital Signs  Patient's weight is 264.    Procedures    Assessment/Plan:     Diagnoses and all orders for this visit:    1. Class 3 severe obesity due to excess calories with serious comorbidity and body mass index (BMI) of 40.0 to 44.9 in adult (Primary)  -     phentermine (ADIPEX-P) 37.5 MG tablet; Take 1 tablet by mouth Every Morning Before Breakfast for 30 days.  Dispense: 30 tablet; Refill: 0    2. Other eczema  -     triamcinolone (KENALOG) 0.1 % cream; Apply 1 Application topically to the appropriate area as directed 2 (Two) Times a Day. Apply to right heel as needed, twice a day  Dispense: 45 g; Refill: 2    3. Fungal skin infection  -     clotrimazole-betamethasone (LOTRISONE) 1-0.05 % cream; Apply 1 Application topically to the appropriate area as directed 2 (Two) Times a Day. Apply to rash in groin as needed  Dispense: 45 g; Refill: 2    4. Anxiety                Assessment & Plan  1. Weight management.  Her blood pressure readings are within normal range today, which is expected to improve further with weight loss. She was advised to maintain a healthy diet throughout the day. A prescription for phentermine was provided, with instructions to take half a pill in the morning initially, and then increase  to a full pill as tolerated. She was also advised to continue her metformin regimen due to her prediabetic status. A referral to the bariatric clinic was suggested for meal planning and lifestyle modification support.    2. Rash on right ankle.  The rash on her heel appears to be eczema. A prescription for a cream to be applied twice daily was provided for the rash on the back of her right ankle. She was advised to use hydroxyzine for itching as needed.    3. Rash in groin.  The groin rash resembles a yeast infection. A prescription for a cream to be applied twice daily was provided for the groin rash. She was advised to use hydroxyzine for itching as needed.    4. Anxiety.  She was advised to monitor for any side effects from the medication. If increased anxiety is experienced, she can discontinue the medication immediately. She was also advised to use hydroxyzine for anxiety as needed.    Follow-up  A follow-up visit is scheduled in 4 weeks to monitor her progress on the appetite suppressant.    An After Visit Summary was printed and given to the patient at discharge.  Return in about 4 weeks (around 9/12/2024) for in person or video visit on a wednesday morning.       There are no Patient Instructions on file for this visit.      Discussion:     Armando reviewed, CSC    I spent 32 minutes caring for Libia on this date of service. This time includes time spent by me in the following activities: preparing for the visit, reviewing tests, performing a medically appropriate examination and/or evaluation, counseling and educating the patient/family/caregiver, documenting information in the medical record, independently interpreting results and communicating that information with the patient/family/caregiver, care coordination, ordering medications, obtaining a separately obtained history, and reviewing a separately obtained history       Patient or patient representative verbalized consent for the use of Ambient Listening  during the visit with  DANA Castle for chart documentation. 8/15/2024  09:08 CDT    Malena CORTEZ 8/15/2024   Electronically signed.

## 2024-08-23 ENCOUNTER — TELEPHONE (OUTPATIENT)
Dept: FAMILY MEDICINE CLINIC | Facility: CLINIC | Age: 54
End: 2024-08-23
Payer: MEDICAID

## 2024-08-23 NOTE — TELEPHONE ENCOUNTER
PATIENT CALLED CONFUSED ABOUT  MY CHART MESSAGE SHE IS COMING FOR HER APPT ON 08- AND WILL HAVE HER LABS DONE PRIOR AND THEN SHE HAS AN APPT ON 09- THAT WE WILL DISCUSS TO EITHER MOVE OR  KEEP AFTER HER APPT -.  PATIENT UNDERSTANDS.

## 2024-08-26 ENCOUNTER — LAB (OUTPATIENT)
Dept: LAB | Facility: HOSPITAL | Age: 54
End: 2024-08-26
Payer: MEDICAID

## 2024-08-26 DIAGNOSIS — E78.5 HYPERLIPIDEMIA, UNSPECIFIED HYPERLIPIDEMIA TYPE: ICD-10-CM

## 2024-08-26 LAB
CHOLEST SERPL-MCNC: 190 MG/DL (ref 0–200)
HDLC SERPL-MCNC: 44 MG/DL (ref 40–60)
LDLC SERPL CALC-MCNC: 112 MG/DL (ref 0–100)
LDLC/HDLC SERPL: 2.43 {RATIO}
TRIGL SERPL-MCNC: 195 MG/DL (ref 0–150)
VLDLC SERPL-MCNC: 34 MG/DL (ref 5–40)

## 2024-08-26 PROCEDURE — 80061 LIPID PANEL: CPT

## 2024-08-26 PROCEDURE — 36415 COLL VENOUS BLD VENIPUNCTURE: CPT

## 2024-08-26 NOTE — PROGRESS NOTES
Cholesterol is overall doing well, decrease sugary drinks and breads, pastas, cereals. It will continue to get better with weight loss

## 2024-08-29 ENCOUNTER — OFFICE VISIT (OUTPATIENT)
Dept: FAMILY MEDICINE CLINIC | Facility: CLINIC | Age: 54
End: 2024-08-29
Payer: MEDICAID

## 2024-08-29 VITALS
BODY MASS INDEX: 43.65 KG/M2 | WEIGHT: 262 LBS | HEART RATE: 77 BPM | DIASTOLIC BLOOD PRESSURE: 100 MMHG | SYSTOLIC BLOOD PRESSURE: 120 MMHG | OXYGEN SATURATION: 98 % | HEIGHT: 65 IN | TEMPERATURE: 98.7 F

## 2024-08-29 DIAGNOSIS — F41.9 ANXIETY: ICD-10-CM

## 2024-08-29 DIAGNOSIS — I10 PRIMARY HYPERTENSION: ICD-10-CM

## 2024-08-29 DIAGNOSIS — B36.9 FUNGAL SKIN INFECTION: Primary | ICD-10-CM

## 2024-08-29 DIAGNOSIS — L30.8 OTHER ECZEMA: ICD-10-CM

## 2024-08-29 PROCEDURE — 3080F DIAST BP >= 90 MM HG: CPT | Performed by: NURSE PRACTITIONER

## 2024-08-29 PROCEDURE — 3074F SYST BP LT 130 MM HG: CPT | Performed by: NURSE PRACTITIONER

## 2024-08-29 PROCEDURE — 1126F AMNT PAIN NOTED NONE PRSNT: CPT | Performed by: NURSE PRACTITIONER

## 2024-08-29 PROCEDURE — 99214 OFFICE O/P EST MOD 30 MIN: CPT | Performed by: NURSE PRACTITIONER

## 2024-08-29 RX ORDER — FLUCONAZOLE 150 MG/1
TABLET ORAL
Qty: 3 TABLET | Refills: 0 | Status: SHIPPED | OUTPATIENT
Start: 2024-08-29

## 2024-08-29 RX ORDER — TRIAMCINOLONE ACETONIDE 1 MG/G
1 OINTMENT TOPICAL 2 TIMES DAILY
Qty: 80 G | Refills: 1 | Status: SHIPPED | OUTPATIENT
Start: 2024-08-29

## 2024-08-29 RX ORDER — HYDROCHLOROTHIAZIDE 25 MG/1
25 TABLET ORAL DAILY
Qty: 90 TABLET | Refills: 1 | Status: SHIPPED | OUTPATIENT
Start: 2024-08-29

## 2024-08-29 NOTE — PROGRESS NOTES
DANA Castle  [facility]   Family Medicine  [address]  [place] [address]  Phone: [phone]  Fax: [phone]         Chief Complaint:  Chief Complaint   Patient presents with    Follow-up     3 month follow up on weight gain, intermittent HTN, water rentention. Patient was Rx'd HCTZ- patient states she is still experiencing swelling bilat ankle swelling.    Weight Check     Patient would like to discuss weight gain        History:  Libia Trevino is a 54 y.o. female that is an established patient. She  is here for evaluation of the above complaint and management of chronic conditions.    Follow-upCurrent symptoms include no chest pain and no palpitations.      History of Present Illness  The patient presents for evaluation of multiple medical concerns.    She reports that her blood pressure was 120/100 today, a significant increase from her usual readings. Additionally, she mentions experiencing leg swelling in the evenings, which is noticeable over her socks or on the sides of her feet when she removes her sandals. Her current medication regimen includes hydrochlorothiazide and metformin.    She has been taking phentermine, which has led to a decrease in her appetite. On some days, she doesn't eat until she returns home around 6 or 7 PM. There have been instances where she eats three times a day, but these are rare. Yesterday, she only consumed half a tomato and didn't eat anything else until 8 PM. She also reports a change in her sense of smell, with food odors becoming unpleasant.    She has a rash that hasn't shown any improvement despite applying a topical treatment twice daily. She notes that the rash appears less dark when she wears sandals. Fungal infection in the groin seems to be fading unless she sweats excessively, but it's still there. She also experiences itching.    She has been dealing with anxiety and tried hydroxyzine again, but didn't feel it helped much.  "She also reports feeling stressed. She is having a lot of anxiety and rage with working with her ex. She has had an adverse reaction to Buspirone in the past. She doesn't remember the other medications she's tried for mood. She liked Dr. Esteban when she saw her, but didn't like other providers in that office.     She is taking levothyroxine when she remembers it.      Results  Laboratory Studies  LDL cholesterol has decreased. Triglycerides are a little high.       ROS:  Review of Systems   Constitutional:  Positive for appetite change.   HENT: Negative.     Respiratory: Negative.     Cardiovascular:  Positive for leg swelling. Negative for chest pain and palpitations.   Skin:  Positive for rash.   Psychiatric/Behavioral:  Positive for dysphoric mood. Negative for self-injury and suicidal ideas. The patient is nervous/anxious.          reports that she has never smoked. She has never used smokeless tobacco. She reports that she does not currently use alcohol. She reports that she does not use drugs.    Current Outpatient Medications   Medication Instructions    clotrimazole-betamethasone (LOTRISONE) 1-0.05 % cream 1 Application, Topical, 2 Times Daily, Apply to rash in groin as needed    fluconazole (Diflucan) 150 MG tablet One by mouth/day for 3 days    hydroCHLOROthiazide 25 mg, Oral, Daily    levothyroxine (SYNTHROID, LEVOTHROID) 88 mcg, Oral, Daily    metFORMIN (GLUCOPHAGE) 500 MG tablet TAKE 2 TABLETS BY MOUTH TWICE DAILY WITH MEALS FOR WEIGHT LOSS    sertraline (ZOLOFT) 50 mg, Oral, Daily    triamcinolone (KENALOG) 0.1 % ointment 1 Application, Topical, 2 Times Daily    Vitamin B 12 500 mcg, Oral, Daily       OBJECTIVE:  /100 (BP Location: Right arm, Patient Position: Sitting, Cuff Size: Adult)   Pulse 77   Temp 98.7 °F (37.1 °C) (Temporal)   Ht 165.1 cm (65\")   Wt 119 kg (262 lb)   SpO2 98%   BMI 43.60 kg/m²    Physical Exam  Vitals and nursing note reviewed.   Constitutional:       Appearance: " Normal appearance.   HENT:      Head: Normocephalic and atraumatic.      Nose: Nose normal.   Eyes:      Conjunctiva/sclera: Conjunctivae normal.   Cardiovascular:      Rate and Rhythm: Normal rate and regular rhythm.   Pulmonary:      Effort: Pulmonary effort is normal. No respiratory distress.      Breath sounds: Normal breath sounds. No wheezing or rales.   Skin:     Findings: Rash is macular and scaling. Rash is not urticarial.             Comments: Eczema right heel   Neurological:      General: No focal deficit present.      Mental Status: She is alert and oriented to person, place, and time.   Psychiatric:         Mood and Affect: Mood normal.         Behavior: Behavior normal.       Physical Exam      Procedures    Assessment/Plan:     Diagnoses and all orders for this visit:    1. Fungal skin infection (Primary)  -     fluconazole (Diflucan) 150 MG tablet; One by mouth/day for 3 days  Dispense: 3 tablet; Refill: 0    2. Other eczema  -     triamcinolone (KENALOG) 0.1 % ointment; Apply 1 Application topically to the appropriate area as directed 2 (Two) Times a Day.  Dispense: 80 g; Refill: 1    3. Anxiety  -     sertraline (Zoloft) 50 MG tablet; Take 1 tablet by mouth Daily.  Dispense: 90 tablet; Refill: 1    4. Primary hypertension  -     hydroCHLOROthiazide 25 MG tablet; Take 1 tablet by mouth Daily.  Dispense: 90 tablet; Refill: 1           Assessment & Plan  1. Hypertension.  Elevated blood pressure was noted during the visit, possibly exacerbated by phentermine. The dosage of hydrochlorothiazide will be increased to 25 mg, with instructions to take two 12.5 mg tablets if available. Blood pressure will be monitored to assess the effectiveness of this adjustment.    2. Decreased appetite.  Phentermine could be contributing to her elevated blood pressure and decreased appetite. Phentermine will be discontinued. She is advised to maintain a healthy diet throughout the day and incorporate healthy snacks to  ensure adequate nutrition.    3. Rash.  The rash appears to be fungal in nature and has not improved with the current treatment. A prescription for Diflucan, one pill daily for three days, will be provided. Additionally, an ointment with stronger potency will be prescribed to replace the current cream.    4. Anxiety.  She reports significant anxiety, especially related to her interactions with her ex-partner. Hydroxyzine 25 mg taken three times a day was previously tried but caused grogginess. Zoloft (sertraline) will be prescribed for daily use to help manage anxiety and mood. The potential side effects and benefits were discussed.        An After Visit Summary was printed and given to the patient at discharge.  No follow-ups on file.       There are no Patient Instructions on file for this visit.      Discussion:     I spent 35 minutes caring for Libia on this date of service. This time includes time spent by me in the following activities: preparing for the visit, reviewing tests, performing a medically appropriate examination and/or evaluation, counseling and educating the patient/family/caregiver, documenting information in the medical record, independently interpreting results and communicating that information with the patient/family/caregiver, care coordination, ordering medications, obtaining a separately obtained history, and reviewing a separately obtained history   Patient or patient representative verbalized consent for the use of Ambient Listening during the visit with  DANA Castle for chart documentation. 8/29/2024  09:14 CDT    aMlena CORTEZ 8/29/2024   Electronically signed.

## 2024-10-07 ENCOUNTER — OFFICE VISIT (OUTPATIENT)
Dept: FAMILY MEDICINE CLINIC | Facility: CLINIC | Age: 54
End: 2024-10-07
Payer: MEDICAID

## 2024-10-07 VITALS
WEIGHT: 267 LBS | TEMPERATURE: 96.5 F | HEART RATE: 61 BPM | BODY MASS INDEX: 44.48 KG/M2 | HEIGHT: 65 IN | DIASTOLIC BLOOD PRESSURE: 75 MMHG | OXYGEN SATURATION: 99 % | SYSTOLIC BLOOD PRESSURE: 130 MMHG | RESPIRATION RATE: 19 BRPM

## 2024-10-07 DIAGNOSIS — M25.512 CHRONIC LEFT SHOULDER PAIN: ICD-10-CM

## 2024-10-07 DIAGNOSIS — Z28.21 FLU VACCINE REFUSED: ICD-10-CM

## 2024-10-07 DIAGNOSIS — Z00.00 ANNUAL PHYSICAL EXAM: Primary | ICD-10-CM

## 2024-10-07 DIAGNOSIS — I10 PRIMARY HYPERTENSION: ICD-10-CM

## 2024-10-07 DIAGNOSIS — Z28.20 VACCINE REFUSED BY PATIENT: ICD-10-CM

## 2024-10-07 DIAGNOSIS — F41.9 ANXIETY: ICD-10-CM

## 2024-10-07 DIAGNOSIS — F32.A DEPRESSION, UNSPECIFIED DEPRESSION TYPE: ICD-10-CM

## 2024-10-07 DIAGNOSIS — G89.29 CHRONIC LEFT SHOULDER PAIN: ICD-10-CM

## 2024-10-07 PROCEDURE — 3078F DIAST BP <80 MM HG: CPT | Performed by: NURSE PRACTITIONER

## 2024-10-07 PROCEDURE — 1126F AMNT PAIN NOTED NONE PRSNT: CPT | Performed by: NURSE PRACTITIONER

## 2024-10-07 PROCEDURE — 99396 PREV VISIT EST AGE 40-64: CPT | Performed by: NURSE PRACTITIONER

## 2024-10-07 PROCEDURE — 3075F SYST BP GE 130 - 139MM HG: CPT | Performed by: NURSE PRACTITIONER

## 2024-10-07 RX ORDER — SERTRALINE HYDROCHLORIDE 100 MG/1
100 TABLET, FILM COATED ORAL DAILY
Qty: 90 TABLET | Refills: 1 | Status: SHIPPED | OUTPATIENT
Start: 2024-10-07

## 2024-10-07 NOTE — PROGRESS NOTES
DANA Castle  [facility]   Family Medicine  [address]  [place] [address]  Phone: [phone]  Fax: [phone]         Chief Complaint:  Chief Complaint   Patient presents with    4-week follow up     Patient is following up on her medications        History:  Libia Trevino is a 54 y.o. female that is an established patient. She  is here for evaluation of the above complaint and management of chronic conditions.    HPI   History of Present Illness  The patient presents for evaluation of multiple medical concerns.    She reports that her blood pressure was elevated yesterday, measuring 140/98. She experiences lightheadedness when standing up or bending over, which lasts for about 5 minutes before subsiding. She is currently on hydrochlorothiazide for blood pressure management. She also mentions occasional drops in blood pressure while driving, but she manages to control it.    She finds Zoloft beneficial on some days, but not consistently. She missed her dose yesterday due to a busy schedule, which resulted in increased irritability and agitation. She expresses concern about potential dependency on the medication. She has previously tried counseling and therapy without significant improvement.    She reports no recent illnesses such as sinus infections, bronchitis, or COVID-19. She has an upcoming appointment with her eye doctor and needs to update her glasses prescription, which changes annually. She has previously consulted Dr. Canchola regarding weight loss.    She had shoulder surgery 2 years ago and experiences pain with certain movements. She consulted orthopedics and underwent an MRI, which revealed rotator cuff issues and tissue buildup. These were addressed through arthroscopic surgery, resulting in 3 small incisions.      Results         ROS:  Review of Systems   Constitutional: Negative.    HENT: Negative.     Respiratory: Negative.     Cardiovascular: Negative.   "  Gastrointestinal: Negative.    Neurological:  Positive for light-headedness.   Psychiatric/Behavioral:  The patient is nervous/anxious.          reports that she has never smoked. She has never used smokeless tobacco. She reports that she does not currently use alcohol. She reports that she does not use drugs.    Current Outpatient Medications   Medication Instructions    clotrimazole-betamethasone (LOTRISONE) 1-0.05 % cream 1 Application, Topical, 2 Times Daily, Apply to rash in groin as needed    fluconazole (Diflucan) 150 MG tablet One by mouth/day for 3 days    hydroCHLOROthiazide 25 mg, Oral, Daily    levothyroxine (SYNTHROID, LEVOTHROID) 88 mcg, Oral, Daily    metFORMIN (GLUCOPHAGE) 500 MG tablet TAKE 2 TABLETS BY MOUTH TWICE DAILY WITH MEALS FOR WEIGHT LOSS    sertraline (ZOLOFT) 100 mg, Oral, Daily    triamcinolone (KENALOG) 0.1 % ointment 1 Application, Topical, 2 Times Daily    Vitamin B 12 500 mcg, Oral, Daily       OBJECTIVE:  /75 (BP Location: Right arm, Patient Position: Sitting, Cuff Size: Adult)   Pulse 61   Temp 96.5 °F (35.8 °C) (Infrared)   Resp 19   Ht 165.1 cm (65\")   Wt 121 kg (267 lb)   SpO2 99%   BMI 44.43 kg/m²    Physical Exam  Vitals and nursing note reviewed.   Constitutional:       Appearance: Normal appearance.   HENT:      Head: Normocephalic and atraumatic.      Right Ear: Tympanic membrane, ear canal and external ear normal.      Left Ear: Tympanic membrane, ear canal and external ear normal.      Nose: Nose normal.      Mouth/Throat:      Mouth: Mucous membranes are moist.      Pharynx: Oropharynx is clear.   Eyes:      Extraocular Movements: Extraocular movements intact.      Conjunctiva/sclera: Conjunctivae normal.      Pupils: Pupils are equal, round, and reactive to light.   Cardiovascular:      Rate and Rhythm: Normal rate and regular rhythm.      Pulses: Normal pulses.      Heart sounds: Normal heart sounds.   Pulmonary:      Effort: Pulmonary effort is normal. "      Breath sounds: Normal breath sounds.   Abdominal:      General: Bowel sounds are normal.      Palpations: Abdomen is soft.   Musculoskeletal:         General: Normal range of motion.      Right shoulder: Normal.      Left shoulder: Tenderness present. Decreased range of motion.      Cervical back: Normal range of motion and neck supple.   Skin:     General: Skin is warm and dry.      Capillary Refill: Capillary refill takes 2 to 3 seconds. Capillary refill takes less than 2 seconds.   Neurological:      General: No focal deficit present.      Mental Status: She is alert and oriented to person, place, and time.   Psychiatric:         Attention and Perception: Attention and perception normal.         Mood and Affect: Affect normal. Mood is depressed.         Speech: Speech normal.         Behavior: Behavior normal. Behavior is cooperative.         Thought Content: Thought content normal. Thought content does not include homicidal or suicidal ideation. Thought content does not include homicidal or suicidal plan.         Cognition and Memory: Cognition normal.       Physical Exam      Procedures    Assessment/Plan:     Diagnoses and all orders for this visit:    1. Annual physical exam (Primary)  -Immunizations:      - Tetanus: up to date      - Influenza: Due, but refused.      - Prevnar: Due, but refused.      - Shingrix: Due, but refused.      - COVID: refused  CRC screening: Cologuard completed. Next screening due in 5/2027.  Mammogram: was done on approximately 8/2024 and the result was: Birads 0 (Incomplete). Diagnostic mammo ordered  PAP: discontinued secondary to benign hysterectomy.  DEXA: DEXA scan at 63     2. Anxiety  -     sertraline (Zoloft) 100 MG tablet; Take 1 tablet by mouth Daily.  Dispense: 90 tablet; Refill: 1    3. Depression, unspecified depression type  -     sertraline (Zoloft) 100 MG tablet; Take 1 tablet by mouth Daily.  Dispense: 90 tablet; Refill: 1    4. Flu vaccine refused    5.  Vaccine refused by patient    6. Chronic left shoulder pain    7. Primary hypertension           Assessment & Plan  1. Hypertension.  Her blood pressure readings are within the normal range today. She reports occasional lightheadedness when standing up or driving, which resolves within a few minutes. She is currently taking hydrochlorothiazide. No additional medication will be introduced at this time.    2. Depression.  She reports variability in her symptoms, with some days being better than others. She notices increased irritability and agitation on days she misses her dose of Zoloft. The dosage of Zoloft will be increased from 50 mg to 100 mg. She can take two 50 mg tablets until the new prescription is filled. Counseling and therapy options were discussed, but she has not found them helpful in the past.    3. Shoulder pain.  She reports ongoing stiffness and pain in her shoulder, especially with certain movements. She had shoulder surgery approximately 1.5 years ago, which included cleaning out tissue buildup. Physical therapy was attempted but was not effective. She will continue to monitor her symptoms.    4. Health Maintenance.  She declined the influenza vaccine. The shingles vaccine was offered, but she chose not to receive it today. She was reminded to schedule an eye exam as her prescription changes yearly.    Follow-up  Return in 3 months for follow up.    An After Visit Summary was printed and given to the patient at discharge.  Return in about 3 months (around 1/7/2025).       Patient Instructions   Increase Zoloft to 100 mg daily      Discussion:     I spent 35 minutes caring for Libia on this date of service. This time includes time spent by me in the following activities: preparing for the visit, reviewing tests, performing a medically appropriate examination and/or evaluation, counseling and educating the patient/family/caregiver, documenting information in the medical record, independently  interpreting results and communicating that information with the patient/family/caregiver, care coordination, ordering medications, obtaining a separately obtained history, and reviewing a separately obtained history   Patient or patient representative verbalized consent for the use of Ambient Listening during the visit with  DANA Castle for chart documentation. 10/7/2024  13:09 CDT    Malena CORTEZ 10/7/2024   Electronically signed.

## 2024-11-04 DIAGNOSIS — B36.9 FUNGAL SKIN INFECTION: Primary | ICD-10-CM

## 2024-11-04 RX ORDER — FLUCONAZOLE 150 MG/1
TABLET ORAL
Qty: 4 TABLET | Refills: 0 | Status: SHIPPED | OUTPATIENT
Start: 2024-11-04

## 2025-01-17 ENCOUNTER — E-VISIT (OUTPATIENT)
Dept: INTERNAL MEDICINE | Age: 55
End: 2025-01-17
Payer: MEDICAID

## 2025-01-17 DIAGNOSIS — H10.9 CONJUNCTIVITIS, UNSPECIFIED CONJUNCTIVITIS TYPE, UNSPECIFIED LATERALITY: Primary | ICD-10-CM

## 2025-01-17 PROCEDURE — 99421 OL DIG E/M SVC 5-10 MIN: CPT | Performed by: INTERNAL MEDICINE

## 2025-01-17 RX ORDER — TOBRAMYCIN 3 MG/ML
1 SOLUTION/ DROPS OPHTHALMIC EVERY 6 HOURS
Qty: 5 ML | Refills: 0 | Status: SHIPPED | OUTPATIENT
Start: 2025-01-17 | End: 2025-01-27

## 2025-01-17 NOTE — PROGRESS NOTES
Patient submitted an e-visit what appears to be acute conjunctivitis.  Tobrex drops sent to pharmacy.  Patient advised if her symptoms worsen to contact her PCP.  About 5 minutes spent on e-visit    .esign

## 2025-01-31 ENCOUNTER — HOSPITAL ENCOUNTER (OUTPATIENT)
Dept: WOMENS IMAGING | Age: 55
Discharge: HOME | End: 2025-01-31
Payer: MEDICAID

## 2025-01-31 VITALS — BODY MASS INDEX: 43.27 KG/M2 | WEIGHT: 260 LBS

## 2025-01-31 DIAGNOSIS — Z12.31 ENCOUNTER FOR SCREENING MAMMOGRAM FOR MALIGNANT NEOPLASM OF BREAST: ICD-10-CM

## 2025-01-31 PROCEDURE — 77067 SCR MAMMO BI INCL CAD: CPT

## 2025-02-21 ENCOUNTER — OFFICE VISIT (OUTPATIENT)
Age: 55
End: 2025-02-21
Payer: MEDICAID

## 2025-02-21 VITALS
RESPIRATION RATE: 20 BRPM | DIASTOLIC BLOOD PRESSURE: 70 MMHG | BODY MASS INDEX: 45.82 KG/M2 | TEMPERATURE: 97.6 F | WEIGHT: 275 LBS | HEIGHT: 65 IN | OXYGEN SATURATION: 98 % | HEART RATE: 80 BPM | SYSTOLIC BLOOD PRESSURE: 126 MMHG

## 2025-02-21 DIAGNOSIS — H92.03 OTALGIA OF BOTH EARS: Primary | ICD-10-CM

## 2025-02-21 DIAGNOSIS — H65.191 ACUTE MEE (MIDDLE EAR EFFUSION), RIGHT: ICD-10-CM

## 2025-02-21 DIAGNOSIS — E66.813 CLASS 3 SEVERE OBESITY DUE TO EXCESS CALORIES WITH SERIOUS COMORBIDITY AND BODY MASS INDEX (BMI) OF 40.0 TO 44.9 IN ADULT: ICD-10-CM

## 2025-02-21 DIAGNOSIS — J02.9 SORE THROAT: ICD-10-CM

## 2025-02-21 DIAGNOSIS — H61.22 IMPACTED CERUMEN, LEFT EAR: ICD-10-CM

## 2025-02-21 DIAGNOSIS — E66.01 CLASS 3 SEVERE OBESITY DUE TO EXCESS CALORIES WITH SERIOUS COMORBIDITY AND BODY MASS INDEX (BMI) OF 40.0 TO 44.9 IN ADULT: ICD-10-CM

## 2025-02-21 LAB — S PYO AG THROAT QL: NORMAL

## 2025-02-21 PROCEDURE — 87880 STREP A ASSAY W/OPTIC: CPT

## 2025-02-21 PROCEDURE — 3078F DIAST BP <80 MM HG: CPT

## 2025-02-21 PROCEDURE — 99213 OFFICE O/P EST LOW 20 MIN: CPT

## 2025-02-21 PROCEDURE — 3074F SYST BP LT 130 MM HG: CPT

## 2025-02-21 RX ORDER — AZITHROMYCIN 250 MG/1
TABLET, FILM COATED ORAL
Qty: 6 TABLET | Refills: 0 | Status: SHIPPED | OUTPATIENT
Start: 2025-02-21 | End: 2025-03-03

## 2025-02-21 ASSESSMENT — ENCOUNTER SYMPTOMS
ABDOMINAL PAIN: 0
CHEST TIGHTNESS: 0
WHEEZING: 0
SORE THROAT: 1
COUGH: 0
SINUS PAIN: 0
EYE DISCHARGE: 0
COLOR CHANGE: 0
SINUS PRESSURE: 0
EYE PAIN: 0
TROUBLE SWALLOWING: 0
APNEA: 0
NAUSEA: 0
DIARRHEA: 0
SHORTNESS OF BREATH: 0
EYE REDNESS: 0
CHOKING: 0
FACIAL SWELLING: 0
VOMITING: 0
ABDOMINAL DISTENTION: 0
STRIDOR: 0
CONSTIPATION: 0

## 2025-02-21 NOTE — PROGRESS NOTES
[facility]  [facility]  [address] [place] [id number]  Dept: [phone]  Dept Fax: [phone]  Loc: [phone]    Isabella Keys is a 54 y.o. female who presents today for her medical conditions/complaints as noted below.  Isabella Keys is complaining of Ear Pain (X 7 days  Ears throbbing when chewing.) and Sore Throat        HPI:   Isabella Keys presents to the clinic today with complaints of bilateral otalgia and sore throat for over one week. Denies fever, body aches, chills, drainage from ears. Denies known exposure to sick contacts. No alleviating factors reported for this. Symptoms are moderate. Patient is stable.     Ear Pain   Associated symptoms include a sore throat. Pertinent negatives include no abdominal pain, coughing, diarrhea, ear discharge, headaches, hearing loss, rash or vomiting.       History reviewed. No pertinent past medical history.    Past Surgical History:   Procedure Laterality Date    CHOLECYSTECTOMY      DILATION AND CURETTAGE OF UTERUS      HYSTERECTOMY (CERVIX STATUS UNKNOWN)      PARTIAL HYSTERECTOMY (CERVIX NOT REMOVED)      TONSILLECTOMY AND ADENOIDECTOMY  1986    TUBAL LIGATION         Family History   Problem Relation Age of Onset    Diabetes Paternal Uncle     High Blood Pressure Paternal Uncle     Diabetes Paternal Grandmother     High Blood Pressure Paternal Grandmother     Heart Attack Paternal Grandmother     Heart Disease Paternal Grandmother     Colon Cancer Neg Hx     Colon Polyps Neg Hx        Social History     Tobacco Use    Smoking status: Former     Current packs/day: 0.00     Average packs/day: 1 pack/day for 18.0 years (18.0 ttl pk-yrs)     Types: Cigarettes     Start date:      Quit date: 2007     Years since quittin.1    Smokeless tobacco: Never   Substance Use Topics    Alcohol use: No        Current Outpatient Medications   Medication Sig Dispense Refill    azithromycin

## 2025-03-21 ENCOUNTER — OFFICE VISIT (OUTPATIENT)
Dept: FAMILY MEDICINE CLINIC | Facility: CLINIC | Age: 55
End: 2025-03-21
Payer: MEDICAID

## 2025-03-21 VITALS
OXYGEN SATURATION: 99 % | WEIGHT: 278 LBS | HEART RATE: 86 BPM | BODY MASS INDEX: 46.32 KG/M2 | DIASTOLIC BLOOD PRESSURE: 82 MMHG | TEMPERATURE: 97.8 F | SYSTOLIC BLOOD PRESSURE: 140 MMHG | HEIGHT: 65 IN

## 2025-03-21 DIAGNOSIS — M79.642 BILATERAL HAND PAIN: ICD-10-CM

## 2025-03-21 DIAGNOSIS — Z00.00 HEALTHCARE MAINTENANCE: ICD-10-CM

## 2025-03-21 DIAGNOSIS — M79.641 BILATERAL HAND PAIN: ICD-10-CM

## 2025-03-21 DIAGNOSIS — R73.03 PRE-DIABETES: Primary | ICD-10-CM

## 2025-03-21 DIAGNOSIS — E66.01 CLASS 3 SEVERE OBESITY DUE TO EXCESS CALORIES WITH SERIOUS COMORBIDITY AND BODY MASS INDEX (BMI) OF 40.0 TO 44.9 IN ADULT: ICD-10-CM

## 2025-03-21 DIAGNOSIS — F32.A DEPRESSION, UNSPECIFIED DEPRESSION TYPE: ICD-10-CM

## 2025-03-21 DIAGNOSIS — I10 PRIMARY HYPERTENSION: ICD-10-CM

## 2025-03-21 DIAGNOSIS — E66.813 CLASS 3 SEVERE OBESITY DUE TO EXCESS CALORIES WITH SERIOUS COMORBIDITY AND BODY MASS INDEX (BMI) OF 40.0 TO 44.9 IN ADULT: ICD-10-CM

## 2025-03-21 DIAGNOSIS — F41.9 ANXIETY: ICD-10-CM

## 2025-03-21 LAB
EXPIRATION DATE: ABNORMAL
HBA1C MFR BLD: 5.8 % (ref 4.5–5.7)
Lab: ABNORMAL

## 2025-03-21 RX ORDER — HYDROCHLOROTHIAZIDE 25 MG/1
25 TABLET ORAL DAILY
Qty: 90 TABLET | Refills: 1 | Status: SHIPPED | OUTPATIENT
Start: 2025-03-21

## 2025-03-21 RX ORDER — SERTRALINE HYDROCHLORIDE 100 MG/1
100 TABLET, FILM COATED ORAL DAILY
Qty: 90 TABLET | Refills: 1 | Status: SHIPPED | OUTPATIENT
Start: 2025-03-21

## 2025-03-21 RX ORDER — MELOXICAM 7.5 MG/1
7.5 TABLET ORAL DAILY
Qty: 30 TABLET | Refills: 2 | Status: SHIPPED | OUTPATIENT
Start: 2025-03-21

## 2025-03-21 RX ORDER — TIZANIDINE HYDROCHLORIDE 2 MG/1
CAPSULE, GELATIN COATED ORAL
Qty: 30 CAPSULE | Refills: 2 | Status: SHIPPED | OUTPATIENT
Start: 2025-03-21

## 2025-03-21 NOTE — PROGRESS NOTES
DANA Castle  [facility]   Family Medicine  [address]  [place] [address]  Phone: [phone]  Fax: [phone]         Chief Complaint:  Chief Complaint   Patient presents with    Med Refill        History:  Libia Trevino is a 54 y.o. female that is an established patient. She  is here for evaluation of the above complaint and management of chronic conditions.    HPI   History of Present Illness  The patient is a 54-year-old female who presents for evaluation of bilateral arm pain, prediabetes, weight management, and anxiety.    She reports experiencing tingling and numbness in both arms and hands, which is exacerbated by certain positions such as sitting, sleeping, or writing. These symptoms are also present during driving and have become severe enough to disrupt her sleep. She finds temporary relief by shaking her hands upon waking. The severity of the symptoms is equal in both arms. She has sought chiropractic treatment from [facility], but recent sessions have not provided significant relief. The numbness extends beyond her arms and hands, radiating upwards and triggering severe headaches, one of which has persisted for 3 days. She underwent a nerve conduction study in 2023, which did not reveal any abnormalities. She also had shoulder surgery performed by Dr. Sky in 2023.    She expresses concern about her weight, which was recorded as 278 pounds today. Despite being on metformin for nearly 3 weeks, she has not observed any significant weight loss, with her weight remaining around 271 pounds. She has previously consulted with a bariatric clinic but did not find the experience beneficial. She describes feeling uncomfortable and self-conscious about her appearance, likening it to being 9 months pregnant. She reports no personal or family history of thyroid cancer.    She reports that her son was recently diagnosed with a leaky valve, which has caused her significant  "anxiety. She also mentions that her grandmother had diabetes and  of a heart attack in .      FAMILY HISTORY  Her grandmother had diabetes and  of a heart attack in .  Her son was recently diagnosed with a leaky valve.    MEDICATIONS  Current: metformin      Results  Laboratory Studies  A1c is 5.8.       ROS:  Review of Systems   Constitutional:  Positive for fatigue and unexpected weight change. Negative for chills, diaphoresis and fever.   HENT:  Negative for congestion and sore throat.    Respiratory:  Negative for cough.    Cardiovascular:  Negative for chest pain.   Gastrointestinal:  Negative for abdominal pain, nausea and vomiting.   Genitourinary:  Negative for dysuria.   Musculoskeletal:  Positive for neck pain. Negative for myalgias.        Bilateral hand pain   Skin:  Negative for rash.   Neurological:  Positive for numbness. Negative for weakness and headaches.         reports that she has never smoked. She has never used smokeless tobacco. She reports that she does not currently use alcohol. She reports that she does not use drugs.    Current Outpatient Medications   Medication Instructions    hydroCHLOROthiazide 25 mg, Oral, Daily    levothyroxine (SYNTHROID, LEVOTHROID) 88 mcg, Oral, Daily    meloxicam (MOBIC) 7.5 mg, Oral, Daily    metFORMIN (GLUCOPHAGE) 1,000 mg, Oral, 2 Times Daily    Semaglutide-Weight Management 0.25 mg, Subcutaneous, Weekly    sertraline (ZOLOFT) 100 mg, Oral, Daily    TiZANidine (ZANAFLEX) 2 MG capsule Take at night    triamcinolone (KENALOG) 0.1 % ointment 1 Application, Topical, 2 Times Daily    Vitamin B 12 500 mcg, Daily       OBJECTIVE:  /82   Pulse 86   Temp 97.8 °F (36.6 °C)   Ht 165.1 cm (65\")   Wt 126 kg (278 lb)   SpO2 99%   BMI 46.26 kg/m²    Physical Exam  Vitals and nursing note reviewed.   Constitutional:       Appearance: Normal appearance. She is obese.   HENT:      Head: Normocephalic and atraumatic.      Nose: Nose normal.   Eyes: "      Conjunctiva/sclera: Conjunctivae normal.   Cardiovascular:      Rate and Rhythm: Normal rate and regular rhythm.   Pulmonary:      Effort: Pulmonary effort is normal. No respiratory distress.      Breath sounds: Normal breath sounds. No wheezing or rales.   Musculoskeletal:      Right hand: Tenderness present. Normal capillary refill. Normal pulse.      Left hand: Tenderness present. Normal capillary refill. Normal pulse.      Comments: Positive phalen, positive Tinel, bilaterally    Neurological:      General: No focal deficit present.      Mental Status: She is alert and oriented to person, place, and time.   Psychiatric:         Mood and Affect: Mood normal.         Behavior: Behavior normal.       Physical Exam  Vital Signs  Weight is 278.    Procedures    Assessment/Plan:     Diagnoses and all orders for this visit:    1. Pre-diabetes (Primary)  -     POC Glycosylated Hemoglobin (Hb A1C)    2. Anxiety  -     sertraline (Zoloft) 100 MG tablet; Take 1 tablet by mouth Daily.  Dispense: 90 tablet; Refill: 1    3. Depression, unspecified depression type  -     sertraline (Zoloft) 100 MG tablet; Take 1 tablet by mouth Daily.  Dispense: 90 tablet; Refill: 1    4. Class 3 severe obesity due to excess calories with serious comorbidity and body mass index (BMI) of 40.0 to 44.9 in adult  -     metFORMIN (GLUCOPHAGE) 500 MG tablet; Take 2 tablets by mouth 2 (Two) Times a Day for 30 days.  Dispense: 120 tablet; Refill: 0  -     Semaglutide-Weight Management 0.25 MG/0.5ML solution auto-injector; Inject 0.5 mL under the skin into the appropriate area as directed 1 (One) Time Per Week for 4 doses.  Dispense: 2 mL; Refill: 0    5. Primary hypertension  -     hydroCHLOROthiazide 25 MG tablet; Take 1 tablet by mouth Daily.  Dispense: 90 tablet; Refill: 1    6. Bilateral hand pain  -     Ambulatory Referral to Sports Medicine  -     meloxicam (Mobic) 7.5 MG tablet; Take 1 tablet by mouth Daily.  Dispense: 30 tablet; Refill:  2  -     TiZANidine (ZANAFLEX) 2 MG capsule; Take at night  Dispense: 30 capsule; Refill: 2    7. Healthcare maintenance  -     Lipid Panel; Future  -     T4, Free; Future  -     TSH; Future  -     Comprehensive Metabolic Panel; Future  -     CBC & Differential; Future           Assessment & Plan  1. Bilateral arm pain.  The symptoms suggest a possible diagnosis of carpal tunnel syndrome. A referral to orthopedics will be initiated for further evaluation and potential re-administration of the nerve conduction study.    2. Prediabetes.  Her A1c levels have consistently remained at 5.8, indicating a prediabetic state. She is currently on metformin.    3. Weight management.  She has expressed interest in trying semaglutide injections for weight loss. The potential side effects, including gastrointestinal symptoms such as nausea and constipation, have been discussed. A prescription for semaglutide will be provided, with instructions to administer the injection once weekly for a duration of 4 weeks at an initial dose of 0.25 mg. If tolerated, the dose will be increased to 0.5 mg.    4. Anxiety.  She reports significant anxiety related to her son's recent cardiac diagnosis and family history of heart disease.    PROCEDURE  The patient underwent shoulder surgery performed by Dr. Sky in 2023.    An After Visit Summary was printed and given to the patient at discharge.  Return in about 4 weeks (around 4/18/2025) for in person or video visit on a wednesday.       There are no Patient Instructions on file for this visit.      Discussion:     I spent 35 minutes caring for Libia on this date of service. This time includes time spent by me in the following activities: preparing for the visit, reviewing tests, performing a medically appropriate examination and/or evaluation, counseling and educating the patient/family/caregiver, referring and communicating with other health care professionals, documenting information in the  medical record, independently interpreting results and communicating that information with the patient/family/caregiver, care coordination, ordering medications, ordering test(s), obtaining a separately obtained history, and reviewing a separately obtained history   Patient or patient representative verbalized consent for the use of Ambient Listening during the visit with  DANA Castle for chart documentation. 3/21/2025  16:57 CDT    Malena CORTEZ 3/21/2025   Electronically signed.

## 2025-03-24 ENCOUNTER — LAB (OUTPATIENT)
Dept: LAB | Facility: HOSPITAL | Age: 55
End: 2025-03-24
Payer: MEDICAID

## 2025-03-24 DIAGNOSIS — Z00.00 HEALTHCARE MAINTENANCE: ICD-10-CM

## 2025-03-24 LAB
ALBUMIN SERPL-MCNC: 4.1 G/DL (ref 3.5–5.2)
ALBUMIN/GLOB SERPL: 1.2 G/DL
ALP SERPL-CCNC: 151 U/L (ref 39–117)
ALT SERPL W P-5'-P-CCNC: 34 U/L (ref 1–33)
ANION GAP SERPL CALCULATED.3IONS-SCNC: 10 MMOL/L (ref 5–15)
AST SERPL-CCNC: 25 U/L (ref 1–32)
BASOPHILS # BLD AUTO: 0.05 10*3/MM3 (ref 0–0.2)
BASOPHILS NFR BLD AUTO: 0.5 % (ref 0–1.5)
BILIRUB SERPL-MCNC: 0.4 MG/DL (ref 0–1.2)
BUN SERPL-MCNC: 18 MG/DL (ref 6–20)
BUN/CREAT SERPL: 23.4 (ref 7–25)
CALCIUM SPEC-SCNC: 9 MG/DL (ref 8.6–10.5)
CHLORIDE SERPL-SCNC: 102 MMOL/L (ref 98–107)
CHOLEST SERPL-MCNC: 248 MG/DL (ref 0–200)
CO2 SERPL-SCNC: 27 MMOL/L (ref 22–29)
CREAT SERPL-MCNC: 0.77 MG/DL (ref 0.57–1)
DEPRECATED RDW RBC AUTO: 47.9 FL (ref 37–54)
EGFRCR SERPLBLD CKD-EPI 2021: 90.7 ML/MIN/1.73
EOSINOPHIL # BLD AUTO: 0.14 10*3/MM3 (ref 0–0.4)
EOSINOPHIL NFR BLD AUTO: 1.5 % (ref 0.3–6.2)
ERYTHROCYTE [DISTWIDTH] IN BLOOD BY AUTOMATED COUNT: 14.1 % (ref 12.3–15.4)
GLOBULIN UR ELPH-MCNC: 3.5 GM/DL
GLUCOSE SERPL-MCNC: 105 MG/DL (ref 65–99)
HCT VFR BLD AUTO: 46.8 % (ref 34–46.6)
HDLC SERPL-MCNC: 52 MG/DL (ref 40–60)
HGB BLD-MCNC: 14.6 G/DL (ref 12–15.9)
IMM GRANULOCYTES # BLD AUTO: 0.03 10*3/MM3 (ref 0–0.05)
IMM GRANULOCYTES NFR BLD AUTO: 0.3 % (ref 0–0.5)
LDLC SERPL CALC-MCNC: 163 MG/DL (ref 0–100)
LDLC/HDLC SERPL: 3.07 {RATIO}
LYMPHOCYTES # BLD AUTO: 2.51 10*3/MM3 (ref 0.7–3.1)
LYMPHOCYTES NFR BLD AUTO: 27.3 % (ref 19.6–45.3)
MCH RBC QN AUTO: 28.5 PG (ref 26.6–33)
MCHC RBC AUTO-ENTMCNC: 31.2 G/DL (ref 31.5–35.7)
MCV RBC AUTO: 91.4 FL (ref 79–97)
MONOCYTES # BLD AUTO: 0.56 10*3/MM3 (ref 0.1–0.9)
MONOCYTES NFR BLD AUTO: 6.1 % (ref 5–12)
NEUTROPHILS NFR BLD AUTO: 5.89 10*3/MM3 (ref 1.7–7)
NEUTROPHILS NFR BLD AUTO: 64.3 % (ref 42.7–76)
NRBC BLD AUTO-RTO: 0 /100 WBC (ref 0–0.2)
PLATELET # BLD AUTO: 234 10*3/MM3 (ref 140–450)
PMV BLD AUTO: 11.4 FL (ref 6–12)
POTASSIUM SERPL-SCNC: 4.5 MMOL/L (ref 3.5–5.2)
PROT SERPL-MCNC: 7.6 G/DL (ref 6–8.5)
RBC # BLD AUTO: 5.12 10*6/MM3 (ref 3.77–5.28)
SODIUM SERPL-SCNC: 139 MMOL/L (ref 136–145)
T4 FREE SERPL-MCNC: 0.9 NG/DL (ref 0.93–1.7)
TRIGL SERPL-MCNC: 183 MG/DL (ref 0–150)
TSH SERPL DL<=0.05 MIU/L-ACNC: 6.74 UIU/ML (ref 0.27–4.2)
VLDLC SERPL-MCNC: 33 MG/DL (ref 5–40)
WBC NRBC COR # BLD AUTO: 9.18 10*3/MM3 (ref 3.4–10.8)

## 2025-03-24 PROCEDURE — 36415 COLL VENOUS BLD VENIPUNCTURE: CPT

## 2025-03-24 PROCEDURE — 80061 LIPID PANEL: CPT

## 2025-03-24 PROCEDURE — 84439 ASSAY OF FREE THYROXINE: CPT

## 2025-03-24 PROCEDURE — 80053 COMPREHEN METABOLIC PANEL: CPT

## 2025-03-24 PROCEDURE — 84443 ASSAY THYROID STIM HORMONE: CPT

## 2025-03-24 PROCEDURE — 85025 COMPLETE CBC W/AUTO DIFF WBC: CPT

## 2025-03-26 ENCOUNTER — TELEPHONE (OUTPATIENT)
Age: 55
End: 2025-03-26
Payer: MEDICAID

## 2025-03-26 DIAGNOSIS — M54.12 CERVICAL RADICULOPATHY: Primary | ICD-10-CM

## 2025-03-26 NOTE — TELEPHONE ENCOUNTER
Patient informed of X-ray order by Dr. Portillo at [facility]. Patient to arrive 30 minutes before appointment at [address] outpatient lab and imaging.

## 2025-03-27 ENCOUNTER — HOSPITAL ENCOUNTER (OUTPATIENT)
Dept: GENERAL RADIOLOGY | Facility: HOSPITAL | Age: 55
Discharge: HOME | End: 2025-03-27
Admitting: STUDENT IN AN ORGANIZED HEALTH CARE EDUCATION/TRAINING PROGRAM
Payer: MEDICAID

## 2025-03-27 ENCOUNTER — OFFICE VISIT (OUTPATIENT)
Age: 55
End: 2025-03-27
Payer: MEDICAID

## 2025-03-27 VITALS — WEIGHT: 278 LBS | HEIGHT: 65 IN | BODY MASS INDEX: 46.32 KG/M2

## 2025-03-27 DIAGNOSIS — M54.12 CERVICAL RADICULOPATHY: ICD-10-CM

## 2025-03-27 DIAGNOSIS — E66.813 CLASS 3 SEVERE OBESITY DUE TO EXCESS CALORIES WITH SERIOUS COMORBIDITY AND BODY MASS INDEX (BMI) OF 45.0 TO 49.9 IN ADULT: ICD-10-CM

## 2025-03-27 DIAGNOSIS — R20.0 BILATERAL HAND NUMBNESS: ICD-10-CM

## 2025-03-27 DIAGNOSIS — E66.01 CLASS 3 SEVERE OBESITY DUE TO EXCESS CALORIES WITH SERIOUS COMORBIDITY AND BODY MASS INDEX (BMI) OF 45.0 TO 49.9 IN ADULT: ICD-10-CM

## 2025-03-27 DIAGNOSIS — G56.03 BILATERAL CARPAL TUNNEL SYNDROME: Primary | ICD-10-CM

## 2025-03-27 PROCEDURE — 1160F RVW MEDS BY RX/DR IN RCRD: CPT | Performed by: STUDENT IN AN ORGANIZED HEALTH CARE EDUCATION/TRAINING PROGRAM

## 2025-03-27 PROCEDURE — 72040 X-RAY EXAM NECK SPINE 2-3 VW: CPT

## 2025-03-27 PROCEDURE — 1159F MED LIST DOCD IN RCRD: CPT | Performed by: STUDENT IN AN ORGANIZED HEALTH CARE EDUCATION/TRAINING PROGRAM

## 2025-03-27 PROCEDURE — 99204 OFFICE O/P NEW MOD 45 MIN: CPT | Performed by: STUDENT IN AN ORGANIZED HEALTH CARE EDUCATION/TRAINING PROGRAM

## 2025-03-27 RX ORDER — GABAPENTIN 100 MG/1
100 CAPSULE ORAL NIGHTLY
Qty: 30 CAPSULE | Refills: 0 | Status: SHIPPED | OUTPATIENT
Start: 2025-03-27

## 2025-03-27 NOTE — PROGRESS NOTES
[facility] Orthopedics & Sports Medicine  Param Portillo MD, PhD  Primo Portillo PA-C    CHIEF COMPLAINT  Initial Evaluation of the Cervical Spine (Patient presents today for bilateral hand numbness, tingling, and pain. X-rays of cervical spine performed at [facility] on 03/26/2025. Patient states symptoms began about 2 years ago and is progressively getting worse. NCS/EMG at [facility] 2 years ago. Patient also complains of weakness in both arms. Patient states symptoms are worse at night. She has gone to a chiropractor in the past.)       HISTORY OF PRESENT ILLNESS  Tingling and numbness in bilateral arms and hands.  Also present during driving.  Has become severe enough to disrupt her sleep.  She sometimes wakes up and shakes her hands to get relief.  She has had chiropractic treatments.  She had a nerve conduction study in 2023 which did not reveal any abnormalities.  She had a shoulder surgery by Dr. Sky in 2023.  History of Present Illness  The patient is a 54-year-old female who presents as a new patient with bilateral arm weakness and numbness.    She reports experiencing difficulty in mobilizing her left arm, describing it as unresponsive to her commands. This issue has been persistent for an extended period. She also mentions a sensation akin to pins being inserted into her arms. However, she insists that this is a significant concern that requires further investigation. Despite various attempts at different interventions, the issue remains unresolved. She was eventually referred to an orthopedic group by a physician who took her concerns seriously. She underwent surgery on her left shoulder performed by Dr. Sky, which improved her mobility and allowed her to perform daily activities such as dressing. However, over the past few years, she has noticed a progressive worsening of her hand symptoms, particularly numbness in both arms. The numbness is not localized to specific fingers or parts of the hand  but is widespread across both hands. She experiences sharp, needle-like pain when attempting to grasp objects, which forces her to release them. This pain is not constant but occurs intermittently. She also reports difficulty in performing tasks such as drinking while driving due to the numbness. She has not received any injections for carpal tunnel syndrome and has only undergone one nerve conduction study. She is right-hand dominant and reports more severe symptoms in her right hand. She also reports neck issues. She has not taken meloxicam due to insurance issues but has used Aleve and ibuprofen. She has never taken gabapentin. She took tizanidine last night to manage the pain.       HISTORY    Current Outpatient Medications   Medication Instructions    gabapentin (NEURONTIN) 100 mg, Oral, Nightly    hydroCHLOROthiazide 25 mg, Oral, Daily    levothyroxine (SYNTHROID, LEVOTHROID) 88 mcg, Oral, Daily    meloxicam (MOBIC) 7.5 mg, Oral, Daily    metFORMIN (GLUCOPHAGE) 1,000 mg, Oral, 2 Times Daily    Semaglutide-Weight Management 0.25 mg, Subcutaneous, Weekly    sertraline (ZOLOFT) 100 mg, Oral, Daily    TiZANidine (ZANAFLEX) 2 MG capsule Take at night    triamcinolone (KENALOG) 0.1 % ointment 1 Application, Topical, 2 Times Daily    Vitamin B 12 500 mcg, Daily         reports that she has never smoked. She has never used smokeless tobacco. She reports that she does not currently use alcohol. She reports that she does not use drugs.    Past Medical History:   Diagnosis Date    Anxiety     Arthritis     Chronic fatigue     COPD (chronic obstructive pulmonary disease)     Disease of thyroid gland     Elevated cholesterol     High cholesterol     Hypertension     Shoulder pain         Past Surgical History:   Procedure Laterality Date    BICEPS TENDONESIS SUBPECTORALIS REPAIR Left 4/27/2023    Procedure: BICEPS TENOTOMY;  Surgeon: Andrea Sky MD;  Location: [facility];  Service: Orthopedics;  Laterality: Left;     CHOLECYSTECTOMY      D & C AND LAPAROSCOPY      HYSTERECTOMY      partial    SHOULDER ARTHROSCOPY W/ ROTATOR CUFF REPAIR Left 4/27/2023    Procedure: LEFT SHOULDER ARTHROSCOPIC ROTATOR CUFF REPAIR, BICEPS TENOTOMY, SUBACROMIAL DECOMPRESSION, DISTAL CLAVICLE EXCISION;  Surgeon: Andrea Sky MD;  Location: [facility];  Service: Orthopedics;  Laterality: Left;    TONSILECTOMY, ADENOIDECTOMY, BILATERAL MYRINGOTOMY AND TUBES      TUBAL ABDOMINAL LIGATION          PHYSICAL EXAM  Constitutional: The patient is in no apparent distress and generally well-appearing. The patient hears me clearly and answers questions appropriately.   Musculoskeletal:  Bilateral upper extremities:  Intact sensation throughout upper extremities  No focal weakness  Positive Tinels and Phalen's bilaterally  No visible muscle atrophy  Physical Exam        IMAGING    XR Spine Cervical 2 or 3 View  Result Date: 3/27/2025  Narrative: EXAMINATION:  XR SPINE CERVICAL 2 OR 3 VW-  3/27/2025 7:55 AM  HISTORY: M54.12-Radiculopathy, cervical region.  COMPARISON: No comparison study.  TECHNIQUE: 2 views and 3 images were obtained.  FINDINGS: There is straightening of the cervical spine on the lateral images. There is quite a bit of shoulder artifact making evaluation of the lower cervical spine difficult. There is severe narrowing of the C4-5, C5-6 and C6-7 disc spaces. The C7-T1 disc space is not very well seen. No fracture is seen on the images provided. There is no prevertebral soft tissue swelling. The visualized facet joints appear to be normally aligned. There may be some degree of facet arthropathy in the mid to lower cervical spine.       Impression: 1. Somewhat limited imaging, as described. 2. Degenerative changes, as described. 3. Straightening on the lateral images may be positional or due to muscle spasm.  This report was signed and finalized on 3/27/2025 9:43 AM by Dr. Guido Crespo MD.       I reviewed the EMG/NCS of the right upper extremity  "from 9/14/2021.  The impression from that examination was noted to have \"probable sensory neuropathy.  Bilateral mild carpal tunnel syndrome.  No sign of radiculopathy noted.  Correlate clinically.\"  Results  Imaging  X-rays above personally reviewed and interpreted.   X-ray of the neck shows bony spurring between C5 and C6 and C6 and C7, with substantial arthritis at those levels.       ASSESSMENT & PLAN  Diagnoses and all orders for this visit:    1. Bilateral carpal tunnel syndrome (Primary)  -     EMG & Nerve Conduction Test; Future  -     gabapentin (NEURONTIN) 100 MG capsule; Take 1 capsule by mouth Every Night.  Dispense: 30 capsule; Refill: 0    2. Bilateral hand numbness  -     EMG & Nerve Conduction Test; Future  -     gabapentin (NEURONTIN) 100 MG capsule; Take 1 capsule by mouth Every Night.  Dispense: 30 capsule; Refill: 0    3. Class 3 severe obesity due to excess calories with serious comorbidity and body mass index (BMI) of 45.0 to 49.9 in adult    4. Cervical radiculopathy  -     EMG & Nerve Conduction Test; Future  -     gabapentin (NEURONTIN) 100 MG capsule; Take 1 capsule by mouth Every Night.  Dispense: 30 capsule; Refill: 0    Patient had a nerve conduction study done in 2021 that showed probable sensory neuropathy and bilateral mild carpal tunnel syndrome.  Her symptoms today certainly sound like progression of the carpal tunnel syndrome.  She also has known cervical spine degenerative changes as seen above which are complicating the picture.  I am starting her on gabapentin to help with her pain symptoms.  We discussed doing carpal tunnel injections but she would prefer to hold off on any injections and get further workup first.  I have ordered an EMG/NCS as well.      Assessment & Plan  1. Bilateral carpal tunnel syndrome.  Symptoms include numbness and weakness in both arms, with occasional sharp needle-like pain. A previous nerve conduction study indicated mild bilateral carpal tunnel " syndrome, which may have progressed. An injection into the median nerve at the carpal tunnel was discussed as a potential treatment option, but the patient prefers to start with medication. A prescription for gabapentin 100 mg to be taken nightly has been provided. If well-tolerated, the dosage can be increased either by increasing the dose at night or by taking it multiple times during the day. A nerve conduction study will be ordered to assess the current status of the condition.    2. Cervical radiculopathy.  The patient also exhibits signs of cervical radiculopathy, likely contributing to the arm symptoms. X-rays show substantial arthritis at C5-C6 and C6-C7 with bony spurring. Treatment options discussed include physical therapy, medications like gabapentin, and potentially injections or surgery if symptoms persist. The patient will start with gabapentin 100 mg nightly and follow up based on the results of the nerve conduction study and symptom progression.    PROCEDURE  The patient underwent left shoulder surgery performed by Dr. Sky.    Prescription for gabapentin  EMG/NCS  Follow up: Pending completion of EMG/NCS        Patient or patient representative verbalized consent for the use of Ambient Listening during the visit with  Param Portillo MD for chart documentation. 3/31/2025  07:21 CDT    Param Portillo MD, PhD

## 2025-04-23 ENCOUNTER — TELEMEDICINE (OUTPATIENT)
Dept: FAMILY MEDICINE CLINIC | Facility: CLINIC | Age: 55
End: 2025-04-23
Payer: MEDICAID

## 2025-04-23 VITALS — WEIGHT: 278 LBS | HEIGHT: 65 IN | BODY MASS INDEX: 46.32 KG/M2

## 2025-04-23 DIAGNOSIS — R06.09 DYSPNEA ON EXERTION: Primary | ICD-10-CM

## 2025-04-23 DIAGNOSIS — R91.8 PULMONARY NODULES: ICD-10-CM

## 2025-04-23 PROCEDURE — 1126F AMNT PAIN NOTED NONE PRSNT: CPT | Performed by: NURSE PRACTITIONER

## 2025-04-23 PROCEDURE — 99213 OFFICE O/P EST LOW 20 MIN: CPT | Performed by: NURSE PRACTITIONER

## 2025-04-23 RX ORDER — ALBUTEROL SULFATE 90 UG/1
2 INHALANT RESPIRATORY (INHALATION) EVERY 4 HOURS PRN
Qty: 18 G | Refills: 2 | Status: SHIPPED | OUTPATIENT
Start: 2025-04-23

## 2025-04-23 NOTE — PROGRESS NOTES
DANA [facility]  [facility]   Family Medicine  [address]  [place] [address]  Phone: [phone]  Fax: [phone]     Libia Trevino is a 54 y.o. female.   : 1970    This visit is conducted today via video visit.  You have chosen to receive care through a telehealth visit.  Do you consent to use a video/audio connection for your medical care today? YES    Pt located at Work and provider located at home office.     CC:   Chief Complaint   Patient presents with    Follow-up     Discuss post neurology appointment.        HPI: Libia Trevino is a 54 y.o. female that is an established patient. She  is here for evaluation of the above complaint and management of chronic conditions.    She has increased shortness of breath, especially upon exertion. She thinks she was diagnosed with COPD in . Chest CT in  showed lung nodules. She has used a MAGUI MDI in the past. She is a never-smoker.    She is seeing Dr. Portillo for bilateral arm pain, is having EMG May, 2025.        The following portions of the patient's history were reviewed and updated as appropriate: allergies, current medications, past family history, past medical history, past social history, past surgical history, and problem list.  Past Medical History:   Diagnosis Date    Anxiety     Arthritis     Chronic fatigue     COPD (chronic obstructive pulmonary disease)     Disease of thyroid gland     Elevated cholesterol     High cholesterol     Hypertension     Shoulder pain      Family History   Problem Relation Age of Onset    Obesity Mother     Diabetes Maternal Grandmother     Heart disease Maternal Grandmother     Sleep apnea Maternal Grandmother     Obesity Maternal Grandmother      Social History     Socioeconomic History    Marital status:    Tobacco Use    Smoking status: Never     Passive exposure: Never    Smokeless tobacco: Never   Vaping Use    Vaping status: Never Used   Substance and Sexual Activity  "   Alcohol use: Not Currently    Drug use: Never    Sexual activity: Defer     Review of Systems   Constitutional: Negative.    HENT: Negative.     Respiratory:  Positive for shortness of breath.    Gastrointestinal: Negative.      Ht 165.1 cm (65\")   Wt 126 kg (278 lb)   BMI 46.26 kg/m²   Physical Exam  Vitals and nursing note reviewed.   Constitutional:       Appearance: Normal appearance.   HENT:      Head: Normocephalic and atraumatic.      Nose: Nose normal.   Eyes:      Conjunctiva/sclera: Conjunctivae normal.   Pulmonary:      Effort: Pulmonary effort is normal.   Neurological:      General: No focal deficit present.      Mental Status: She is alert and oriented to person, place, and time.   Psychiatric:         Mood and Affect: Mood normal.         Behavior: Behavior normal.           Assessment and Plan:   Diagnoses and all orders for this visit:    1. Dyspnea on exertion (Primary)  -     XR Chest 2 View; Future  -     Complete PFT - Pre & Post Bronchodilator; Future  -     albuterol sulfate  (90 Base) MCG/ACT inhaler; Inhale 2 puffs Every 4 (Four) Hours As Needed for Wheezing or Shortness of Air (cough chest tightness).  Dispense: 18 g; Refill: 2  -     CT Chest Low Dose Follow Up Without Contrast; Future    2. 6 mm left upper lobe, 5 mm right middle lobe pulmonary nodules  -     CT Chest Low Dose Follow Up Without Contrast; Future          There are no Patient Instructions on file for this visit.    Discussion:     I spent 28 minutes caring for Libia on this date of service. This time includes time spent by me in the following activities: preparing for the visit, reviewing tests, performing a medically appropriate examination and/or evaluation, counseling and educating the patient/family/caregiver, documenting information in the medical record, independently interpreting results and communicating that information with the patient/family/caregiver, care coordination, ordering medications, ordering " test(s), obtaining a separately obtained history, and reviewing a separately obtained history     Follow up: 4 weeks  Malena Wang, DANA  4/23/2025  10:22 CDT

## 2025-05-05 ENCOUNTER — OFFICE VISIT (OUTPATIENT)
Age: 55
End: 2025-05-05

## 2025-05-05 VITALS
TEMPERATURE: 98.7 F | DIASTOLIC BLOOD PRESSURE: 70 MMHG | WEIGHT: 283 LBS | RESPIRATION RATE: 20 BRPM | SYSTOLIC BLOOD PRESSURE: 134 MMHG | BODY MASS INDEX: 47.09 KG/M2 | OXYGEN SATURATION: 99 % | HEART RATE: 68 BPM

## 2025-05-05 DIAGNOSIS — H65.93 MIDDLE EAR EFFUSION, BILATERAL: ICD-10-CM

## 2025-05-05 DIAGNOSIS — H66.003 NON-RECURRENT ACUTE SUPPURATIVE OTITIS MEDIA OF BOTH EARS WITHOUT SPONTANEOUS RUPTURE OF TYMPANIC MEMBRANES: Primary | ICD-10-CM

## 2025-05-05 RX ORDER — AZITHROMYCIN 250 MG/1
TABLET, FILM COATED ORAL
Qty: 6 TABLET | Refills: 0 | Status: SHIPPED | OUTPATIENT
Start: 2025-05-05 | End: 2025-05-15

## 2025-05-05 RX ORDER — METHYLPREDNISOLONE 4 MG/1
TABLET ORAL
Qty: 1 KIT | Refills: 0 | Status: SHIPPED | OUTPATIENT
Start: 2025-05-05 | End: 2025-05-11

## 2025-05-05 RX ORDER — OFLOXACIN 3 MG/ML
5 SOLUTION AURICULAR (OTIC) 2 TIMES DAILY
Qty: 5 ML | Refills: 0 | Status: SHIPPED | OUTPATIENT
Start: 2025-05-05 | End: 2025-05-15

## 2025-05-05 ASSESSMENT — ENCOUNTER SYMPTOMS
VOMITING: 0
COUGH: 0
EYE DISCHARGE: 0
ABDOMINAL PAIN: 0
EYE ITCHING: 0
WHEEZING: 0
CHEST TIGHTNESS: 0
SORE THROAT: 0
TROUBLE SWALLOWING: 0
DIARRHEA: 0
SHORTNESS OF BREATH: 0
ABDOMINAL DISTENTION: 0
APNEA: 0
RHINORRHEA: 0
EYE PAIN: 0
SINUS PAIN: 0
COLOR CHANGE: 0
CONSTIPATION: 0
SINUS PRESSURE: 0

## 2025-05-05 NOTE — PROGRESS NOTES
[facility] URGENT CARE, [facility] ([place])  [facility] URGENT CARE  [address] [place] [address].  [facility] [address]  Dept: [phone]  Dept Fax: [phone]    Isabella Keys is a 54 y.o. female who presents today for her medical conditions/complaints as noted below.  Isabella Keys is c/o of Ear Pain (Both ears mainly right ear, Hurt when bending over, balance off, swollen lymph nodes. )        HPI:     Isabella Keys presents with complaints of bilateral ear pain, dizziness, off balance, and swollen lymph nodes. Patient states her symptoms started a couple of days ago. She denies taking any OTC medications except ibuprofen for pain. No other symptoms. She is stable at this time.     Last antibiotic administered was Zithromax on 25. No reported recent steroid.      History reviewed. No pertinent past medical history.  Past Surgical History:   Procedure Laterality Date    CHOLECYSTECTOMY      DILATION AND CURETTAGE OF UTERUS      HYSTERECTOMY (CERVIX STATUS UNKNOWN)      PARTIAL HYSTERECTOMY (CERVIX NOT REMOVED)      TONSILLECTOMY AND ADENOIDECTOMY      TUBAL LIGATION         Family History   Problem Relation Age of Onset    Diabetes Paternal Uncle     High Blood Pressure Paternal Uncle     Diabetes Paternal Grandmother     High Blood Pressure Paternal Grandmother     Heart Attack Paternal Grandmother     Heart Disease Paternal Grandmother     Colon Cancer Neg Hx     Colon Polyps Neg Hx        Social History     Tobacco Use    Smoking status: Former     Current packs/day: 0.00     Average packs/day: 1 pack/day for 18.0 years (18.0 ttl pk-yrs)     Types: Cigarettes     Start date:      Quit date:      Years since quittin.3    Smokeless tobacco: Never   Substance Use Topics    Alcohol use: No      Current Outpatient Medications   Medication Sig Dispense Refill    azithromycin (ZITHROMAX) 250 MG tablet 500mg on day 1 followed by 250mg on days 2 - 5 6 tablet 0    methylPREDNISolone (MEDROL DOSEPACK)

## 2025-05-05 NOTE — PATIENT INSTRUCTIONS
Bilateral Ear Infection    - Ofloxacin ear drops prescribed. Instill to bilateral ear as directed  - Medrol Dose Pack sent to pharmacy; take as prescribed. Risks/benefits discussed with patient, patient gave informed consent.  - Antibiotics sent to the pharmacy, take as directed.  - Alternate Tylenol/Motrin as needed for fever.  - Rest.  - Encourage oral hydration.  - Apply warm cloth to affected ear for comfort.  - Monitor for any increase in ear pain, new or increase in pus/bloody drainage, or fever with a stick neck/severe headache.  - Follow up with PCP or return to the clinic if symptoms worsen or fail to improve.      Elevated Blood Pressure Reading    - BP readings are considered elevated (greater than 130/80) during clinic visit.  - Initiate exercise (30 minute periods 3-4 times per week).  - Start DASH diet: vegetables, fruits, low-fat dairy, whole grains, poultry/fish.  - Monitor intake of salt, sweet, sugar-sweetened beverages and red meats.  - Limit intake of alcohol (men: no more than 2 drinks, women: no more than 1 drink daily).  - Keep a log by checking BP morning and night, take with you to f/u appointment.   - Make appointment with PCP for chronic maintenance and treatment of BP.

## 2025-05-06 ENCOUNTER — LAB (OUTPATIENT)
Dept: LAB | Facility: HOSPITAL | Age: 55
End: 2025-05-06
Payer: MEDICAID

## 2025-05-06 ENCOUNTER — OFFICE VISIT (OUTPATIENT)
Dept: FAMILY MEDICINE CLINIC | Facility: CLINIC | Age: 55
End: 2025-05-06
Payer: MEDICAID

## 2025-05-06 ENCOUNTER — HOSPITAL ENCOUNTER (OUTPATIENT)
Dept: NEUROLOGY | Facility: HOSPITAL | Age: 55
Discharge: HOME | End: 2025-05-06
Payer: MEDICAID

## 2025-05-06 VITALS
OXYGEN SATURATION: 98 % | HEIGHT: 65 IN | TEMPERATURE: 98 F | SYSTOLIC BLOOD PRESSURE: 126 MMHG | DIASTOLIC BLOOD PRESSURE: 78 MMHG | HEART RATE: 63 BPM | BODY MASS INDEX: 46.98 KG/M2 | WEIGHT: 282 LBS

## 2025-05-06 DIAGNOSIS — M54.12 CERVICAL RADICULOPATHY: ICD-10-CM

## 2025-05-06 DIAGNOSIS — H92.03 OTALGIA OF BOTH EARS: ICD-10-CM

## 2025-05-06 DIAGNOSIS — E03.9 HYPOTHYROIDISM, UNSPECIFIED TYPE: ICD-10-CM

## 2025-05-06 DIAGNOSIS — E66.01 CLASS 3 SEVERE OBESITY DUE TO EXCESS CALORIES WITH SERIOUS COMORBIDITY AND BODY MASS INDEX (BMI) OF 40.0 TO 44.9 IN ADULT: ICD-10-CM

## 2025-05-06 DIAGNOSIS — R06.09 DYSPNEA ON EXERTION: ICD-10-CM

## 2025-05-06 DIAGNOSIS — R06.09 DYSPNEA ON EXERTION: Primary | ICD-10-CM

## 2025-05-06 DIAGNOSIS — R20.0 BILATERAL HAND NUMBNESS: ICD-10-CM

## 2025-05-06 DIAGNOSIS — I10 PRIMARY HYPERTENSION: ICD-10-CM

## 2025-05-06 DIAGNOSIS — E66.813 CLASS 3 SEVERE OBESITY DUE TO EXCESS CALORIES WITH SERIOUS COMORBIDITY AND BODY MASS INDEX (BMI) OF 40.0 TO 44.9 IN ADULT: ICD-10-CM

## 2025-05-06 DIAGNOSIS — G56.03 BILATERAL CARPAL TUNNEL SYNDROME: ICD-10-CM

## 2025-05-06 LAB
NT-PROBNP SERPL-MCNC: 357.5 PG/ML (ref 0–900)
TSH SERPL DL<=0.05 MIU/L-ACNC: 2.11 UIU/ML (ref 0.27–4.2)

## 2025-05-06 PROCEDURE — 99214 OFFICE O/P EST MOD 30 MIN: CPT | Performed by: NURSE PRACTITIONER

## 2025-05-06 PROCEDURE — 36415 COLL VENOUS BLD VENIPUNCTURE: CPT

## 2025-05-06 PROCEDURE — 1126F AMNT PAIN NOTED NONE PRSNT: CPT | Performed by: NURSE PRACTITIONER

## 2025-05-06 PROCEDURE — 83880 ASSAY OF NATRIURETIC PEPTIDE: CPT

## 2025-05-06 PROCEDURE — 95886 MUSC TEST DONE W/N TEST COMP: CPT

## 2025-05-06 PROCEDURE — 3074F SYST BP LT 130 MM HG: CPT | Performed by: NURSE PRACTITIONER

## 2025-05-06 PROCEDURE — 84443 ASSAY THYROID STIM HORMONE: CPT

## 2025-05-06 PROCEDURE — 3078F DIAST BP <80 MM HG: CPT | Performed by: NURSE PRACTITIONER

## 2025-05-06 PROCEDURE — 95911 NRV CNDJ TEST 9-10 STUDIES: CPT

## 2025-05-06 RX ORDER — FLUTICASONE PROPIONATE AND SALMETEROL 250; 50 UG/1; UG/1
1 POWDER RESPIRATORY (INHALATION)
Qty: 60 EACH | Refills: 5 | Status: SHIPPED | OUTPATIENT
Start: 2025-05-06

## 2025-05-12 ENCOUNTER — RESULTS FOLLOW-UP (OUTPATIENT)
Age: 55
End: 2025-05-12
Payer: MEDICAID

## 2025-05-12 ENCOUNTER — HOSPITAL ENCOUNTER (OUTPATIENT)
Dept: PULMONOLOGY | Facility: HOSPITAL | Age: 55
Discharge: HOME | End: 2025-05-12
Admitting: NURSE PRACTITIONER
Payer: MEDICAID

## 2025-05-12 DIAGNOSIS — R06.09 DYSPNEA ON EXERTION: ICD-10-CM

## 2025-05-12 PROCEDURE — 94060 EVALUATION OF WHEEZING: CPT

## 2025-05-12 PROCEDURE — 94729 DIFFUSING CAPACITY: CPT

## 2025-05-12 PROCEDURE — 94726 PLETHYSMOGRAPHY LUNG VOLUMES: CPT

## 2025-05-12 RX ORDER — ALBUTEROL SULFATE 0.83 MG/ML
2.5 SOLUTION RESPIRATORY (INHALATION) ONCE
Status: COMPLETED | OUTPATIENT
Start: 2025-05-12 | End: 2025-05-12

## 2025-05-12 RX ADMIN — ALBUTEROL SULFATE 2.5 MG: 2.5 SOLUTION RESPIRATORY (INHALATION) at 07:39

## 2025-05-13 DIAGNOSIS — G56.03 BILATERAL CARPAL TUNNEL SYNDROME: Primary | ICD-10-CM

## 2025-05-15 ENCOUNTER — RESULTS FOLLOW-UP (OUTPATIENT)
Dept: FAMILY MEDICINE CLINIC | Facility: CLINIC | Age: 55
End: 2025-05-15
Payer: MEDICAID

## 2025-05-15 DIAGNOSIS — R91.1 LUNG NODULE: Primary | ICD-10-CM

## 2025-05-15 NOTE — PROGRESS NOTES
Spirometry shows mild restriction, but no asthma. Continue the inhaler initiated at the last visit, and albuterol as needed

## 2025-05-23 ENCOUNTER — OFFICE VISIT (OUTPATIENT)
Age: 55
End: 2025-05-23
Payer: MEDICAID

## 2025-05-23 VITALS — BODY MASS INDEX: 47.15 KG/M2 | WEIGHT: 283 LBS | HEIGHT: 65 IN

## 2025-05-23 DIAGNOSIS — G56.01 CARPAL TUNNEL SYNDROME OF RIGHT WRIST: ICD-10-CM

## 2025-05-23 DIAGNOSIS — G56.02 CARPAL TUNNEL SYNDROME OF LEFT WRIST: Primary | ICD-10-CM

## 2025-05-23 PROCEDURE — 99204 OFFICE O/P NEW MOD 45 MIN: CPT | Performed by: NURSE PRACTITIONER

## 2025-05-23 RX ORDER — GABAPENTIN 100 MG/1
100 CAPSULE ORAL NIGHTLY
COMMUNITY
Start: 2025-03-27

## 2025-05-23 RX ORDER — TRIAMCINOLONE ACETONIDE 1 MG/G
1 OINTMENT TOPICAL 2 TIMES DAILY
COMMUNITY
Start: 2024-08-29 | End: 2025-05-23

## 2025-05-23 RX ORDER — TIZANIDINE HYDROCHLORIDE 2 MG/1
CAPSULE, GELATIN COATED ORAL
COMMUNITY
Start: 2025-03-21 | End: 2025-05-23

## 2025-05-23 RX ORDER — FLUTICASONE PROPIONATE AND SALMETEROL 250; 50 UG/1; UG/1
1 POWDER RESPIRATORY (INHALATION)
COMMUNITY
Start: 2025-05-06

## 2025-05-23 RX ORDER — METHYLPREDNISOLONE 4 MG/1
TABLET ORAL
Qty: 1 KIT | Refills: 0 | Status: SHIPPED | OUTPATIENT
Start: 2025-05-23

## 2025-05-23 RX ORDER — MELOXICAM 7.5 MG/1
7.5 TABLET ORAL DAILY
COMMUNITY
Start: 2025-03-21

## 2025-05-23 RX ORDER — SERTRALINE HYDROCHLORIDE 100 MG/1
100 TABLET, FILM COATED ORAL DAILY
COMMUNITY
Start: 2025-03-21 | End: 2025-05-23

## 2025-05-23 RX ORDER — HYDROCHLOROTHIAZIDE 25 MG/1
25 TABLET ORAL DAILY
COMMUNITY
Start: 2025-03-22

## 2025-05-23 RX ORDER — ALBUTEROL SULFATE 90 UG/1
2 INHALANT RESPIRATORY (INHALATION) EVERY 4 HOURS PRN
COMMUNITY
End: 2025-05-23

## 2025-05-23 NOTE — PROGRESS NOTES
[facility]  [facility] ORTHOPEDICS  [address]  [facility] [id number] [phone]     Patient: Isabella Keys   YOB: 1970   Date: 2025     Chief Complaint   Patient presents with    Bilateral wrist       History of Present Illness  Isabella is a right hand dominant 54 y.o. female who presents today for initial evaluation by our office with reports of bilateral hand numbness and tingling that has gradually worsened over the course of several years.  She states that it does occasionally cause her some pain as well.  Patient has previously attempted management with Mobic, gabapentin, tizanidine and occasionally bracing with minimal relief.  Notes from outside provider were reviewed today.      No past medical history on file.   Past Surgical History:   Procedure Laterality Date    CHOLECYSTECTOMY      DILATION AND CURETTAGE OF UTERUS      HYSTERECTOMY (CERVIX STATUS UNKNOWN)      PARTIAL HYSTERECTOMY (CERVIX NOT REMOVED)      TONSILLECTOMY AND ADENOIDECTOMY      TUBAL LIGATION        Social History     Socioeconomic History    Marital status:      Spouse name: None    Number of children: None    Years of education: None    Highest education level: None   Tobacco Use    Smoking status: Former     Current packs/day: 0.00     Average packs/day: 1 pack/day for 18.0 years (18.0 ttl pk-yrs)     Types: Cigarettes     Start date:      Quit date:      Years since quittin.4    Smokeless tobacco: Never   Vaping Use    Vaping status: Never Used   Substance and Sexual Activity    Alcohol use: No    Drug use: No     Social Drivers of Health     Financial Resource Strain: Medium Risk (3/10/2024)    Overall Financial Resource Strain (CARDIA)     Difficulty of Paying Living Expenses: Somewhat hard   Food Insecurity: Patient Declined (3/10/2024)    Hunger Vital Sign     Worried About Running Out of Food in the Last Year:

## 2025-05-30 ENCOUNTER — HOSPITAL ENCOUNTER (OUTPATIENT)
Dept: CT IMAGING | Facility: HOSPITAL | Age: 55
Discharge: HOME | End: 2025-05-30
Payer: MEDICAID

## 2025-05-30 DIAGNOSIS — R91.8 PULMONARY NODULES: ICD-10-CM

## 2025-05-30 DIAGNOSIS — R06.09 DYSPNEA ON EXERTION: ICD-10-CM

## 2025-05-30 PROCEDURE — 71250 CT THORAX DX C-: CPT

## 2025-05-30 NOTE — TELEPHONE ENCOUNTER
I spoke to the patient today and advised her that her chest CT shows stable small nodules, the largest has decreased in size, and there are no additional nodules. Additionally, there are no findings to suggest malignancy, and the provider would like for her to repeat the CT in 12 months. The patient voiced understanding.

## 2025-07-07 DIAGNOSIS — E03.9 HYPOTHYROIDISM, UNSPECIFIED TYPE: ICD-10-CM

## 2025-07-07 RX ORDER — LEVOTHYROXINE SODIUM 88 UG/1
88 TABLET ORAL DAILY
Qty: 90 TABLET | Refills: 1 | Status: SHIPPED | OUTPATIENT
Start: 2025-07-07

## 2025-07-10 ENCOUNTER — OFFICE VISIT (OUTPATIENT)
Dept: FAMILY MEDICINE CLINIC | Facility: CLINIC | Age: 55
End: 2025-07-10
Payer: MEDICAID

## 2025-07-10 VITALS
DIASTOLIC BLOOD PRESSURE: 72 MMHG | WEIGHT: 280 LBS | HEIGHT: 65 IN | OXYGEN SATURATION: 98 % | HEART RATE: 75 BPM | SYSTOLIC BLOOD PRESSURE: 128 MMHG | BODY MASS INDEX: 46.65 KG/M2

## 2025-07-10 DIAGNOSIS — I10 PRIMARY HYPERTENSION: ICD-10-CM

## 2025-07-10 DIAGNOSIS — F32.A DEPRESSION, UNSPECIFIED DEPRESSION TYPE: Primary | ICD-10-CM

## 2025-07-10 DIAGNOSIS — R06.09 DYSPNEA ON EXERTION: ICD-10-CM

## 2025-07-10 DIAGNOSIS — R91.1 LUNG NODULE: ICD-10-CM

## 2025-07-10 DIAGNOSIS — F41.9 ANXIETY: ICD-10-CM

## 2025-07-10 DIAGNOSIS — R45.4 ANGER REACTION: ICD-10-CM

## 2025-07-10 PROCEDURE — 3078F DIAST BP <80 MM HG: CPT | Performed by: NURSE PRACTITIONER

## 2025-07-10 PROCEDURE — 3074F SYST BP LT 130 MM HG: CPT | Performed by: NURSE PRACTITIONER

## 2025-07-10 PROCEDURE — 99214 OFFICE O/P EST MOD 30 MIN: CPT | Performed by: NURSE PRACTITIONER

## 2025-07-10 PROCEDURE — 1126F AMNT PAIN NOTED NONE PRSNT: CPT | Performed by: NURSE PRACTITIONER

## 2025-07-10 NOTE — PROGRESS NOTES
DANA Castle  [facility]   Family Medicine  [address]  [place] [address]  Phone: [phone]  Fax: [phone]         Chief Complaint:  Chief Complaint   Patient presents with    Shortness of Breath     2 month follow up        History:  Libia Trevino is a 55 y.o. female that is an established patient. She  is here for evaluation of the above complaint and management of chronic conditions.    Shortness of Breath  Associated symptoms: no chest pain and no leg swelling       History of Present Illness  The patient presents for evaluation of bilateral hand and wrist pain, depression, and blood pressure management.    She has been experiencing pain in both hands and wrists, with the right side being more severe. The pain intensifies when she attempts to lift objects. She consulted an orthopedic specialist who presented her with three treatment options: constant use of a brace, weight loss, or injections. She is currently using the brace as she is not comfortable with the idea of injections. She is considering surgery as a potential future solution.    Her mood has been unstable, leading to feelings of irritability. She has been on Zoloft for over a year but reports that it has not been effective in managing her symptoms. She describes her current mood as very irritated, even with medication.    She reports that her blood pressure has been well-managed with medication, which she took prior to today's visit.    She had an infection, which has improved, although there are occasional days when symptoms persist.      Results  Imaging   - Low dose CT of the chest: Stable lung nodules       ROS:  Review of Systems   Constitutional:  Negative for unexpected weight change.   HENT: Negative.     Respiratory:  Positive for shortness of breath.    Cardiovascular: Negative.  Negative for chest pain, palpitations and leg swelling.   Gastrointestinal: Negative.    Psychiatric/Behavioral:   "Positive for agitation and dysphoric mood. The patient is nervous/anxious.          reports that she has never smoked. She has never been exposed to tobacco smoke. She has never used smokeless tobacco. She reports that she does not currently use alcohol. She reports that she does not use drugs.    Current Outpatient Medications   Medication Instructions    albuterol sulfate  (90 Base) MCG/ACT inhaler 2 puffs, Inhalation, Every 4 Hours PRN    Cariprazine HCl (VRAYLAR) 1.5 mg, Oral, Daily    Fluticasone-Salmeterol (ADVAIR/WIXELA) 250-50 MCG/ACT DISKUS 1 puff, Inhalation, 2 Times Daily - RT    gabapentin (NEURONTIN) 100 mg, Oral, Nightly    hydroCHLOROthiazide 25 mg, Oral, Daily    levothyroxine (SYNTHROID, LEVOTHROID) 88 mcg, Oral, Daily    meloxicam (MOBIC) 7.5 mg, Oral, Daily    metFORMIN (GLUCOPHAGE) 1,000 mg, Oral, 2 Times Daily    sertraline (ZOLOFT) 100 mg, Oral, Daily    TiZANidine (ZANAFLEX) 2 MG capsule Take at night    triamcinolone (KENALOG) 0.1 % ointment 1 Application, Topical, 2 Times Daily    Vitamin B 12 500 mcg, Daily       OBJECTIVE:  /72   Pulse 75   Ht 165.1 cm (65\")   Wt 127 kg (280 lb)   SpO2 98%   BMI 46.59 kg/m²    Physical Exam  Vitals and nursing note reviewed.   Constitutional:       Appearance: Normal appearance.   HENT:      Head: Normocephalic and atraumatic.      Nose: Nose normal.   Eyes:      Conjunctiva/sclera: Conjunctivae normal.   Cardiovascular:      Rate and Rhythm: Normal rate and regular rhythm.   Pulmonary:      Effort: Pulmonary effort is normal. No respiratory distress.      Breath sounds: Normal breath sounds. No wheezing or rales.   Neurological:      General: No focal deficit present.      Mental Status: She is alert and oriented to person, place, and time.   Psychiatric:         Mood and Affect: Mood normal.         Behavior: Behavior normal.       Physical Exam      Procedures    Assessment/Plan:     Diagnoses and all orders for this visit:    1. " Depression, unspecified depression type (Primary)  -     Cariprazine HCl (Vraylar) 1.5 MG capsule capsule; Take 1 capsule by mouth Daily.  Dispense: 30 capsule; Refill: 2    2. Lung nodule    3. Dyspnea on exertion    4. Primary hypertension    5. Anxiety    6. Anger reaction           Assessment & Plan  1. Bilateral hand and wrist pain.  - Reports pain in both hands and wrists, with the right side being worse.  - Orthopedic specialist provided three options: wearing a brace, losing weight, or getting injections.  - Currently using the brace but considering surgery in the future due to persistent pain.  - Surgery is being considered as pain persists despite current management.    2. Depression.  - Reports current medication, Zoloft, is not effectively managing symptoms, leading to increased irritability.  - Vraylar will be added to treatment regimen to help with depression, aggression, and agitation.  - Samples of Vraylar will be provided, and a prescription will be sent to pharmacy.  - Follow-up in 4 weeks to assess effectiveness and consider alternative medications if needed.    3. Blood pressure management.  - Blood pressure readings have been consistently within the normal range during recent visits.  - Currently taking metoprolol.  - Vital signs reviewed and blood pressure remains well-controlled.    Follow-up  - A follow-up appointment is scheduled in 4 weeks.    An After Visit Summary was printed and given to the patient at discharge.  Return in about 4 weeks (around 8/7/2025) for in person or video visit on a wednesday.       There are no Patient Instructions on file for this visit.      Discussion:     I spent 35 minutes caring for Libia on this date of service. This time includes time spent by me in the following activities: preparing for the visit, reviewing tests, performing a medically appropriate examination and/or evaluation, counseling and educating the patient/family/caregiver, documenting  information in the medical record, independently interpreting results and communicating that information with the patient/family/caregiver, care coordination, ordering medications, obtaining a separately obtained history, and reviewing a separately obtained history   Patient or patient representative verbalized consent for the use of Ambient Listening during the visit with  DANA Castle for chart documentation. 7/10/2025  12:02 CDT    Malena CORTEZ 7/10/2025   Electronically signed.

## 2025-07-29 DIAGNOSIS — E66.813 CLASS 3 SEVERE OBESITY DUE TO EXCESS CALORIES WITH SERIOUS COMORBIDITY AND BODY MASS INDEX (BMI) OF 40.0 TO 44.9 IN ADULT: ICD-10-CM

## 2025-08-13 ENCOUNTER — TELEMEDICINE (OUTPATIENT)
Dept: FAMILY MEDICINE CLINIC | Facility: CLINIC | Age: 55
End: 2025-08-13
Payer: MEDICAID

## 2025-08-13 VITALS — HEIGHT: 65 IN | WEIGHT: 280 LBS | BODY MASS INDEX: 46.65 KG/M2

## 2025-08-13 DIAGNOSIS — H02.61 XANTHELASMA OF UPPER EYELIDS OF BOTH EYES: Primary | ICD-10-CM

## 2025-08-13 DIAGNOSIS — F41.9 ANXIETY: ICD-10-CM

## 2025-08-13 DIAGNOSIS — F32.A DEPRESSION, UNSPECIFIED DEPRESSION TYPE: ICD-10-CM

## 2025-08-13 DIAGNOSIS — H02.64 XANTHELASMA OF UPPER EYELIDS OF BOTH EYES: Primary | ICD-10-CM

## 2025-08-13 DIAGNOSIS — I10 PRIMARY HYPERTENSION: ICD-10-CM

## 2025-08-13 PROCEDURE — 1126F AMNT PAIN NOTED NONE PRSNT: CPT | Performed by: NURSE PRACTITIONER

## 2025-08-13 PROCEDURE — 99213 OFFICE O/P EST LOW 20 MIN: CPT | Performed by: NURSE PRACTITIONER

## 2025-08-13 RX ORDER — SERTRALINE HYDROCHLORIDE 100 MG/1
100 TABLET, FILM COATED ORAL DAILY
Qty: 90 TABLET | Refills: 1 | Status: SHIPPED | OUTPATIENT
Start: 2025-08-13

## 2025-08-13 RX ORDER — HYDROCHLOROTHIAZIDE 25 MG/1
25 TABLET ORAL DAILY
Qty: 90 TABLET | Refills: 1 | Status: SHIPPED | OUTPATIENT
Start: 2025-08-13

## 2025-08-22 ENCOUNTER — TELEPHONE (OUTPATIENT)
Age: 55
End: 2025-08-22
Payer: MEDICAID

## 2025-08-25 DIAGNOSIS — E03.9 HYPOTHYROIDISM, UNSPECIFIED TYPE: ICD-10-CM

## 2025-08-25 RX ORDER — LEVOTHYROXINE SODIUM 88 UG/1
88 TABLET ORAL DAILY
Qty: 90 TABLET | Refills: 1 | Status: SHIPPED | OUTPATIENT
Start: 2025-08-25

## 2025-08-27 ENCOUNTER — OFFICE VISIT (OUTPATIENT)
Dept: FAMILY MEDICINE CLINIC | Facility: CLINIC | Age: 55
End: 2025-08-27
Payer: MEDICAID

## 2025-08-27 VITALS
HEIGHT: 65 IN | DIASTOLIC BLOOD PRESSURE: 82 MMHG | TEMPERATURE: 97.1 F | WEIGHT: 286 LBS | SYSTOLIC BLOOD PRESSURE: 124 MMHG | BODY MASS INDEX: 47.65 KG/M2 | HEART RATE: 88 BPM | OXYGEN SATURATION: 99 %

## 2025-08-27 DIAGNOSIS — J06.9 ACUTE URI: Primary | ICD-10-CM

## 2025-08-27 DIAGNOSIS — J06.9 VIRAL UPPER RESPIRATORY TRACT INFECTION WITH COUGH: ICD-10-CM

## 2025-08-27 LAB
EXPIRATION DATE: NORMAL
FLUAV AG UPPER RESP QL IA.RAPID: NOT DETECTED
FLUBV AG UPPER RESP QL IA.RAPID: NOT DETECTED
INTERNAL CONTROL: NORMAL
Lab: NORMAL
SARS-COV-2 AG UPPER RESP QL IA.RAPID: NOT DETECTED

## 2025-08-27 RX ORDER — GUAIFENESIN 600 MG/1
1200 TABLET, EXTENDED RELEASE ORAL 2 TIMES DAILY
Qty: 90 TABLET | Refills: 0 | Status: SHIPPED | OUTPATIENT
Start: 2025-08-27

## 2025-08-27 RX ORDER — BROMPHENIRAMINE MALEATE, PSEUDOEPHEDRINE HYDROCHLORIDE, AND DEXTROMETHORPHAN HYDROBROMIDE 2; 30; 10 MG/5ML; MG/5ML; MG/5ML
2.5 SYRUP ORAL 4 TIMES DAILY PRN
Qty: 118 ML | Refills: 0 | Status: SHIPPED | OUTPATIENT
Start: 2025-08-27

## 2025-08-29 ENCOUNTER — OFFICE VISIT (OUTPATIENT)
Dept: FAMILY MEDICINE CLINIC | Facility: CLINIC | Age: 55
End: 2025-08-29
Payer: MEDICAID

## 2025-08-29 ENCOUNTER — HOSPITAL ENCOUNTER (OUTPATIENT)
Dept: GENERAL RADIOLOGY | Facility: HOSPITAL | Age: 55
Discharge: HOME | End: 2025-08-29
Payer: MEDICAID

## 2025-08-29 VITALS
HEART RATE: 96 BPM | TEMPERATURE: 98.2 F | SYSTOLIC BLOOD PRESSURE: 118 MMHG | HEIGHT: 65 IN | DIASTOLIC BLOOD PRESSURE: 66 MMHG | WEIGHT: 285 LBS | OXYGEN SATURATION: 99 % | BODY MASS INDEX: 47.48 KG/M2

## 2025-08-29 DIAGNOSIS — J06.9 UPPER RESPIRATORY TRACT INFECTION, UNSPECIFIED TYPE: ICD-10-CM

## 2025-08-29 DIAGNOSIS — R05.1 ACUTE COUGH: ICD-10-CM

## 2025-08-29 DIAGNOSIS — R05.1 ACUTE COUGH: Primary | ICD-10-CM

## 2025-08-29 PROCEDURE — 3078F DIAST BP <80 MM HG: CPT | Performed by: NURSE PRACTITIONER

## 2025-08-29 PROCEDURE — 1126F AMNT PAIN NOTED NONE PRSNT: CPT | Performed by: NURSE PRACTITIONER

## 2025-08-29 PROCEDURE — 99214 OFFICE O/P EST MOD 30 MIN: CPT | Performed by: NURSE PRACTITIONER

## 2025-08-29 PROCEDURE — 71046 X-RAY EXAM CHEST 2 VIEWS: CPT

## 2025-08-29 PROCEDURE — 3074F SYST BP LT 130 MM HG: CPT | Performed by: NURSE PRACTITIONER

## 2025-08-29 RX ORDER — AZITHROMYCIN 250 MG/1
TABLET, FILM COATED ORAL
Qty: 6 TABLET | Refills: 0 | Status: SHIPPED | OUTPATIENT
Start: 2025-08-29

## 2025-08-29 RX ORDER — PREDNISONE 20 MG/1
20 TABLET ORAL 2 TIMES DAILY
Qty: 10 TABLET | Refills: 0 | Status: SHIPPED | OUTPATIENT
Start: 2025-08-29 | End: 2025-09-03